# Patient Record
Sex: MALE | Race: WHITE | NOT HISPANIC OR LATINO | Employment: OTHER | URBAN - METROPOLITAN AREA
[De-identification: names, ages, dates, MRNs, and addresses within clinical notes are randomized per-mention and may not be internally consistent; named-entity substitution may affect disease eponyms.]

---

## 2022-07-30 ENCOUNTER — APPOINTMENT (EMERGENCY)
Dept: RADIOLOGY | Facility: HOSPITAL | Age: 62
End: 2022-07-30
Payer: MEDICARE

## 2022-07-30 ENCOUNTER — HOSPITAL ENCOUNTER (EMERGENCY)
Facility: HOSPITAL | Age: 62
Discharge: HOME | End: 2022-07-30
Attending: EMERGENCY MEDICINE | Admitting: EMERGENCY MEDICINE
Payer: MEDICARE

## 2022-07-30 VITALS
OXYGEN SATURATION: 94 % | RESPIRATION RATE: 21 BRPM | HEART RATE: 59 BPM | HEIGHT: 69 IN | TEMPERATURE: 97.8 F | DIASTOLIC BLOOD PRESSURE: 74 MMHG | SYSTOLIC BLOOD PRESSURE: 140 MMHG | BODY MASS INDEX: 29.18 KG/M2 | WEIGHT: 197 LBS

## 2022-07-30 DIAGNOSIS — J44.1 COPD EXACERBATION (HCC): Primary | ICD-10-CM

## 2022-07-30 LAB
2HR DELTA HS TROPONIN: 0 NG/L
ALBUMIN SERPL BCP-MCNC: 3.8 G/DL (ref 3.5–5)
ALP SERPL-CCNC: 86 U/L (ref 46–116)
ALT SERPL W P-5'-P-CCNC: 13 U/L (ref 12–78)
ANION GAP SERPL CALCULATED.3IONS-SCNC: 9 MMOL/L (ref 4–13)
AST SERPL W P-5'-P-CCNC: 17 U/L (ref 5–45)
ATRIAL RATE: 59 BPM
BASOPHILS # BLD AUTO: 0.06 THOUSANDS/ΜL (ref 0–0.1)
BASOPHILS NFR BLD AUTO: 0 % (ref 0–1)
BILIRUB SERPL-MCNC: 0.5 MG/DL (ref 0.2–1)
BUN SERPL-MCNC: 17 MG/DL (ref 5–25)
CALCIUM SERPL-MCNC: 8.8 MG/DL (ref 8.3–10.1)
CARDIAC TROPONIN I PNL SERPL HS: 5 NG/L
CARDIAC TROPONIN I PNL SERPL HS: 5 NG/L
CHLORIDE SERPL-SCNC: 97 MMOL/L (ref 96–108)
CO2 SERPL-SCNC: 28 MMOL/L (ref 21–32)
CREAT SERPL-MCNC: 0.88 MG/DL (ref 0.6–1.3)
D DIMER PPP FEU-MCNC: 0.37 UG/ML FEU
EOSINOPHIL # BLD AUTO: 0.3 THOUSAND/ΜL (ref 0–0.61)
EOSINOPHIL NFR BLD AUTO: 2 % (ref 0–6)
ERYTHROCYTE [DISTWIDTH] IN BLOOD BY AUTOMATED COUNT: 18.2 % (ref 11.6–15.1)
GFR SERPL CREATININE-BSD FRML MDRD: 92 ML/MIN/1.73SQ M
GLUCOSE SERPL-MCNC: 133 MG/DL (ref 65–140)
HCT VFR BLD AUTO: 46.8 % (ref 36.5–49.3)
HGB BLD-MCNC: 14.7 G/DL (ref 12–17)
IMM GRANULOCYTES # BLD AUTO: 0.1 THOUSAND/UL (ref 0–0.2)
IMM GRANULOCYTES NFR BLD AUTO: 1 % (ref 0–2)
LYMPHOCYTES # BLD AUTO: 1.88 THOUSANDS/ΜL (ref 0.6–4.47)
LYMPHOCYTES NFR BLD AUTO: 14 % (ref 14–44)
MCH RBC QN AUTO: 25.4 PG (ref 26.8–34.3)
MCHC RBC AUTO-ENTMCNC: 31.4 G/DL (ref 31.4–37.4)
MCV RBC AUTO: 81 FL (ref 82–98)
MONOCYTES # BLD AUTO: 1.14 THOUSAND/ΜL (ref 0.17–1.22)
MONOCYTES NFR BLD AUTO: 8 % (ref 4–12)
NEUTROPHILS # BLD AUTO: 10.37 THOUSANDS/ΜL (ref 1.85–7.62)
NEUTS SEG NFR BLD AUTO: 75 % (ref 43–75)
NRBC BLD AUTO-RTO: 0 /100 WBCS
P AXIS: 83 DEGREES
PLATELET # BLD AUTO: 349 THOUSANDS/UL (ref 149–390)
PMV BLD AUTO: 9.8 FL (ref 8.9–12.7)
POTASSIUM SERPL-SCNC: 4.4 MMOL/L (ref 3.5–5.3)
PR INTERVAL: 152 MS
PROT SERPL-MCNC: 7.9 G/DL (ref 6.4–8.4)
QRS AXIS: 39 DEGREES
QRSD INTERVAL: 88 MS
QT INTERVAL: 440 MS
QTC INTERVAL: 435 MS
RBC # BLD AUTO: 5.78 MILLION/UL (ref 3.88–5.62)
SARS-COV-2 RNA RESP QL NAA+PROBE: NEGATIVE
SODIUM SERPL-SCNC: 134 MMOL/L (ref 135–147)
T WAVE AXIS: 88 DEGREES
VENTRICULAR RATE: 59 BPM
WBC # BLD AUTO: 13.85 THOUSAND/UL (ref 4.31–10.16)

## 2022-07-30 PROCEDURE — 93010 ELECTROCARDIOGRAM REPORT: CPT | Performed by: INTERNAL MEDICINE

## 2022-07-30 PROCEDURE — 84484 ASSAY OF TROPONIN QUANT: CPT

## 2022-07-30 PROCEDURE — 71045 X-RAY EXAM CHEST 1 VIEW: CPT

## 2022-07-30 PROCEDURE — U0005 INFEC AGEN DETEC AMPLI PROBE: HCPCS

## 2022-07-30 PROCEDURE — 80053 COMPREHEN METABOLIC PANEL: CPT

## 2022-07-30 PROCEDURE — 85025 COMPLETE CBC W/AUTO DIFF WBC: CPT

## 2022-07-30 PROCEDURE — 99285 EMERGENCY DEPT VISIT HI MDM: CPT

## 2022-07-30 PROCEDURE — 85379 FIBRIN DEGRADATION QUANT: CPT

## 2022-07-30 PROCEDURE — 36415 COLL VENOUS BLD VENIPUNCTURE: CPT

## 2022-07-30 PROCEDURE — 93005 ELECTROCARDIOGRAM TRACING: CPT

## 2022-07-30 PROCEDURE — U0003 INFECTIOUS AGENT DETECTION BY NUCLEIC ACID (DNA OR RNA); SEVERE ACUTE RESPIRATORY SYNDROME CORONAVIRUS 2 (SARS-COV-2) (CORONAVIRUS DISEASE [COVID-19]), AMPLIFIED PROBE TECHNIQUE, MAKING USE OF HIGH THROUGHPUT TECHNOLOGIES AS DESCRIBED BY CMS-2020-01-R: HCPCS

## 2022-07-30 RX ORDER — ATENOLOL 50 MG/1
50 TABLET ORAL 2 TIMES DAILY
COMMUNITY

## 2022-07-30 RX ORDER — ROSUVASTATIN CALCIUM 20 MG/1
20 TABLET, COATED ORAL DAILY
COMMUNITY

## 2022-07-30 RX ORDER — DOXYCYCLINE HYCLATE 100 MG/1
100 CAPSULE ORAL 2 TIMES DAILY
Qty: 14 CAPSULE | Refills: 0 | Status: SHIPPED | OUTPATIENT
Start: 2022-07-30 | End: 2022-08-06

## 2022-07-30 RX ORDER — BUPROPION HYDROCHLORIDE 150 MG/1
150 TABLET, EXTENDED RELEASE ORAL 2 TIMES DAILY
COMMUNITY

## 2022-07-30 RX ORDER — ISOSORBIDE MONONITRATE 30 MG/1
30 TABLET, EXTENDED RELEASE ORAL DAILY
COMMUNITY

## 2022-07-30 RX ORDER — DAPAGLIFLOZIN AND METFORMIN HYDROCHLORIDE 10; 1000 MG/1; MG/1
TABLET, FILM COATED, EXTENDED RELEASE ORAL
COMMUNITY

## 2022-07-30 RX ORDER — RANOLAZINE 500 MG/1
500 TABLET, EXTENDED RELEASE ORAL 2 TIMES DAILY
COMMUNITY

## 2022-07-30 NOTE — DISCHARGE INSTRUCTIONS
Take doxycyline 100 mg every 12 hours for the next 7 days     Use your albuterol inhaler every 4-6 hours for SOB symptoms  Make sure you are staying hydrated and resting to allow your body to heal  Schedule an appointment with your PCP for follow-up evaluation regarding ER visit  Return to the ER if you develop chest pain, increasing shortness of breath, fevers, cough up blood, feel dizzy, confused, or like you may pass out

## 2022-07-30 NOTE — ED PROVIDER NOTES
History  Chief Complaint   Patient presents with    Shortness of Breath     Feeling "woozy" for 2 days  Chest heaviness, SOB with exertion, cough     56 y/o male with PMH HTN, CAD, DM2, a  Fib, COPD presents to the ER today for chest heaviness, shortness of breath, cough  Patient says he started not feeling right a week ago  He states yesterday he starting with a productive cough and is coughing up yellow phlegm  States his chest feels heavy, especially when he coughs, and is having difficulty taking deep breaths  SOB worse with ambulation  Those symptoms also started yesterday  Also admits to congestion  Pt denies fever, chills, abdominal pain, nausea, vomiting, changes in urination, changes in BM, dizziness, headache bodyaches  States last week he was in [place] with his friend whose sister has covid  Pt has 2 vaccines but no booster  Had covid a month ago  Tested yesterday and was negative  Allergic to PCN  History provided by:  Patient   used: No    Shortness of Breath  Severity:  Moderate  Onset quality:  Sudden  Duration:  1 day  Timing:  Intermittent  Progression:  Waxing and waning  Chronicity:  Recurrent  Context: activity    Ineffective treatments:  None tried  Associated symptoms: sputum production    Associated symptoms: no abdominal pain, no chest pain, no fever, no headaches, no hemoptysis, no sore throat, no vomiting and no wheezing        Prior to Admission Medications   Prescriptions Last Dose Informant Patient Reported? Taking?    ALBUTEROL IN 7/29/2022 at Unknown time  Yes Yes   Sig: Inhale   Dapagliflozin-metFORMIN HCl ER (Xigduo XR)  MG TB24 7/29/2022 at Unknown time  Yes Yes   Sig: Take by mouth   Fluticasone Furoate-Vilanterol (BREO ELLIPTA IN) 7/29/2022 at Unknown time  Yes Yes   Sig: Inhale   apixaban (Eliquis) 5 mg 7/29/2022 at Unknown time  Yes Yes   Sig: Take 5 mg by mouth 2 (two) times a day   atenolol (TENORMIN) 50 mg tablet 7/29/2022 at Unknown time  Yes Yes   Sig: Take 50 mg by mouth 2 (two) times a day   buPROPion (ZYBAN) 150 MG 12 hr tablet 7/29/2022 at Unknown time  Yes Yes   Sig: Take 150 mg by mouth 2 (two) times a day   isosorbide mononitrate (IMDUR) 30 mg 24 hr tablet 7/29/2022 at Unknown time  Yes Yes   Sig: Take 30 mg by mouth daily   metFORMIN (GLUCOPHAGE) 1000 MG tablet 7/29/2022 at Unknown time  Yes Yes   Sig: Take 1,000 mg by mouth 2 (two) times a day with meals   ranolazine (RANEXA) 500 mg 12 hr tablet 7/29/2022 at Unknown time  Yes Yes   Sig: Take 500 mg by mouth 2 (two) times a day   rosuvastatin (CRESTOR) 20 MG tablet 7/29/2022 at Unknown time  Yes Yes   Sig: Take 20 mg by mouth daily      Facility-Administered Medications: None       Past Medical History:   Diagnosis Date    COPD (chronic obstructive pulmonary disease) ([facility] )     Diabetes mellitus ([facility] )     High cholesterol     Hypertension        Past Surgical History:   Procedure Laterality Date    CHOLECYSTECTOMY      SHOULDER SURGERY         History reviewed  No pertinent family history  I have reviewed and agree with the history as documented  E-Cigarette/Vaping     E-Cigarette/Vaping Substances     Social History     Tobacco Use    Smoking status: Current Every Day Smoker     Packs/day: 0 25    Smokeless tobacco: Never Used   Substance Use Topics    Alcohol use: Never    Drug use: Never       Review of Systems   Constitutional: Negative for chills and fever  HENT: Positive for congestion  Negative for sore throat  Respiratory: Positive for sputum production, chest tightness and shortness of breath  Negative for hemoptysis and wheezing  Cardiovascular: Negative for chest pain  Gastrointestinal: Negative for abdominal pain, nausea and vomiting  Skin: Negative for color change  Neurological: Negative for dizziness, light-headedness and headaches  Psychiatric/Behavioral: Negative for behavioral problems and sleep disturbance     All other systems reviewed and are negative  Physical Exam  Physical Exam  Vitals and nursing note reviewed  Constitutional:       General: He is awake  Appearance: Normal appearance  He is well-developed  Comments: Patient seated comfortably in exam bed   HENT:      Head: Normocephalic and atraumatic  Right Ear: Tympanic membrane, ear canal and external ear normal       Left Ear: Tympanic membrane, ear canal and external ear normal       Nose: Nose normal       Mouth/Throat:      Mouth: Mucous membranes are moist       Pharynx: Oropharynx is clear  Uvula midline  No oropharyngeal exudate or posterior oropharyngeal erythema  Eyes:      General: No scleral icterus  Extraocular Movements: Extraocular movements intact  Pupils: Pupils are equal, round, and reactive to light  Cardiovascular:      Rate and Rhythm: Normal rate and regular rhythm  Heart sounds: Normal heart sounds, S1 normal and S2 normal  No murmur heard  No gallop  Pulmonary:      Effort: Pulmonary effort is normal  Tachypnea present  No respiratory distress  Breath sounds: Normal breath sounds  No decreased breath sounds, wheezing, rhonchi or rales  Comments: Patient not in acute respiratory distress  He is slightly tachypneic at 24 respirations per minute  There is no accessory muscle usage, tripoding, retractions  O2 sats > 94%  Musculoskeletal:         General: Normal range of motion  Cervical back: Normal range of motion and neck supple  Right lower leg: No edema  Left lower leg: No edema  Skin:     General: Skin is warm and dry  Neurological:      General: No focal deficit present  Mental Status: He is alert  Psychiatric:         Attention and Perception: Attention and perception normal          Mood and Affect: Mood normal          Behavior: Behavior normal  Behavior is cooperative           Vital Signs  ED Triage Vitals   Temperature Pulse Respirations Blood Pressure SpO2   07/30/22 0850 07/30/22 0845 07/30/22 0845 07/30/22 0845 07/30/22 0845   97 8 °F (36 6 °C) 58 (!) 24 (!) 179/86 98 %      Temp src Heart Rate Source Patient Position - Orthostatic VS BP Location FiO2 (%)   -- -- -- -- --             Pain Score       --                  Vitals:    07/30/22 0900 07/30/22 1000 07/30/22 1100 07/30/22 1200   BP: 134/72 144/81 138/74 140/74   Pulse: 68 57 64 59         Visual Acuity      ED Medications  Medications - No data to display    Diagnostic Studies  Results Reviewed     Procedure Component Value Units Date/Time    HS Troponin I 2hr [[id number]]  (Normal) Collected: 07/30/22 1117    Lab Status: Final result Specimen: Blood from Arm, Right Updated: 07/30/22 1149     hs TnI 2hr 5 ng/L      Delta 2hr hsTnI 0 ng/L     COVID only [[id number]]  (Normal) Collected: 07/30/22 0927    Lab Status: Final result Specimen: Nares from Nose Updated: 07/30/22 1030     SARS-CoV-2 Negative    Narrative:      FOR PEDIATRIC PATIENTS - copy/paste COVID Guidelines URL to browser: [email or web address]  [employer]    SARS-CoV-2 assay is a Nucleic Acid Amplification assay intended for the  qualitative detection of nucleic acid from SARS-CoV-2 in nasopharyngeal  swabs  Results are for the presumptive identification of SARS-CoV-2 RNA  Positive results are indicative of infection with SARS-CoV-2, the virus  causing COVID-19, but do not rule out bacterial infection or co-infection  with other viruses  Laboratories within the [place] and its  territories are required to report all positive results to the appropriate  public health authorities  Negative results do not preclude SARS-CoV-2  infection and should not be used as the sole basis for treatment or other  patient management decisions  Negative results must be combined with  clinical observations, patient history, and epidemiological information  This test has not been FDA cleared or approved      This test has been authorized by FDA under an Emergency Use Authorization  (EUA)  This test is only authorized for the duration of time the  declaration that circumstances exist justifying the authorization of the  emergency use of an in vitro diagnostic tests for detection of SARS-CoV-2  virus and/or diagnosis of COVID-19 infection under section 564(b)(1) of  the Act, 21 U  S C  474OIJ-4(Q)(0), unless the authorization is terminated  or revoked sooner  The test has been validated but independent review by FDA  and CLIA is pending  Test performed using [employer] Gene[employer]pert: This RT-PCR assay targets N2,  a region unique to SARS-CoV-2  A conserved region in the E-gene was chosen  for pan-Sarbecovirus detection which includes SARS-CoV-2  D-Dimer [[id number]]  (Normal) Collected: 07/30/22 0927    Lab Status: Final result Specimen: Blood from Arm, Right Updated: 07/30/22 1016     D-Dimer, Quant 0 37 ug/ml FEU     Narrative: In the evaluation for possible pulmonary embolism, in the appropriate (Well's Score of 4 or less) patient, the age adjusted d-dimer cutoff for this patient can be calculated as:    Age x 0 01 (in ug/mL) for Age-adjusted D-dimer exclusion threshold for a patient over 50 years      HS Troponin 0hr (reflex protocol) [[id number]]  (Normal) Collected: 07/30/22 0856    Lab Status: Final result Specimen: Blood from Arm, Right Updated: 07/30/22 0930     hs TnI 0hr 5 ng/L     Comprehensive metabolic panel [[id number]]  (Abnormal) Collected: 07/30/22 0856    Lab Status: Final result Specimen: Blood from Arm, Right Updated: 07/30/22 5169     Sodium 134 mmol/L      Potassium 4 4 mmol/L      Chloride 97 mmol/L      CO2 28 mmol/L      ANION GAP 9 mmol/L      BUN 17 mg/dL      Creatinine 0 88 mg/dL      Glucose 133 mg/dL      Calcium 8 8 mg/dL      AST 17 U/L      ALT 13 U/L      Alkaline Phosphatase 86 U/L      Total Protein 7 9 g/dL      Albumin 3 8 g/dL      Total Bilirubin 0 50 mg/dL      eGFR 92 ml/min/1 73sq m     Narrative: National Kidney Disease Foundation guidelines for Chronic Kidney Disease (CKD):     Stage 1 with normal or high GFR (GFR > 90 mL/min/1 73 square meters)    Stage 2 Mild CKD (GFR = 60-89 mL/min/1 73 square meters)    Stage 3A Moderate CKD (GFR = 45-59 mL/min/1 73 square meters)    Stage 3B Moderate CKD (GFR = 30-44 mL/min/1 73 square meters)    Stage 4 Severe CKD (GFR = 15-29 mL/min/1 73 square meters)    Stage 5 End Stage CKD (GFR <15 mL/min/1 73 square meters)  Note: GFR calculation is accurate only with a steady state creatinine    CBC and differential [[id number]]  (Abnormal) Collected: 07/30/22 0856    Lab Status: Final result Specimen: Blood from Arm, Right Updated: 07/30/22 0902     WBC 13 85 Thousand/uL      RBC 5 78 Million/uL      Hemoglobin 14 7 g/dL      Hematocrit 46 8 %      MCV 81 fL      MCH 25 4 pg      MCHC 31 4 g/dL      RDW 18 2 %      MPV 9 8 fL      Platelets 526 Thousands/uL      nRBC 0 /100 WBCs      Neutrophils Relative 75 %      Immat GRANS % 1 %      Lymphocytes Relative 14 %      Monocytes Relative 8 %      Eosinophils Relative 2 %      Basophils Relative 0 %      Neutrophils Absolute 10 37 Thousands/µL      Immature Grans Absolute 0 10 Thousand/uL      Lymphocytes Absolute 1 88 Thousands/µL      Monocytes Absolute 1 14 Thousand/µL      Eosinophils Absolute 0 30 Thousand/µL      Basophils Absolute 0 06 Thousands/µL                  XR chest 1 view portable    (Results Pending)   XR chest 1 view    (Results Pending)              Procedures  ECG 12 Lead Documentation Only    Date/Time: 7/30/2022 9:31 AM  Performed by: Johnny Gates PA-C  Authorized by: Johnny Gates PA-C     Indications / Diagnosis:  Chest heavines, SOB  ECG reviewed by me, the ED Provider: augusto (angie)    Previous ECG:     Previous ECG:  Unavailable  Interpretation:     Interpretation: abnormal    Rate:     ECG rate:  59    ECG rate assessment: bradycardic    Rhythm:     Rhythm: sinus bradycardia Comments:      HR 59 bpm  Sinus bradycardia with PAC, nonspecific ST and T wave abnormality             ED Course             HEART Risk Score    Flowsheet Row Most Recent Value   Heart Score Risk Calculator    History 0 Filed at: 07/30/2022 1611   ECG 1 Filed at: 07/30/2022 1611   Age 1 Filed at: 07/30/2022 1611   Risk Factors 1 Filed at: 07/30/2022 1611   Troponin 0 Filed at: 07/30/2022 1611   HEART Score 3 Filed at: 07/30/2022 1611                PERC Rule for PE    Flowsheet Row Most Recent Value   PERC Rule for PE    Age >=50 1 Filed at: 07/30/2022 0912   HR >=100 0 Filed at: 07/30/2022 0912   O2 Sat on room air < 95% 0 Filed at: 07/30/2022 3302   History of PE or DVT --   Recent trauma or surgery 0 Filed at: 07/30/2022 0912   Hemoptysis 0 Filed at: 07/30/2022 7131   Exogenous estrogen 0 Filed at: 07/30/2022 0912   Unilateral leg swelling 0 Filed at: 07/30/2022 0912   PERC Rule for PE Results 1 Filed at: 07/30/2022 9655                  Wells' Criteria for PE    Flowsheet Row Most Recent Value   Wells' Criteria for PE    Clinical signs and symptoms of DVT 0 Filed at: 07/30/2022 4146   PE is primary diagnosis or equally likely 0 Filed at: 07/30/2022 0913   HR >100 0 Filed at: 07/30/2022 0913   Immobilization at least 3 days or Surgery in the previous 4 weeks 0 Filed at: 07/30/2022 7388   Previous, objectively diagnosed PE or DVT 0 Filed at: 07/30/2022 0913   Hemoptysis 0 Filed at: 07/30/2022 5720   Malignancy with treatment within 6 months or palliative 0 Filed at: 07/30/2022 8595   Wells' Criteria Total 0 Filed at: 07/30/2022 0913                MDM  Number of Diagnoses or Management Options  COPD exacerbation [facility]): new and requires workup  Diagnosis management comments: 56 y/o male  Here for shortness of breath, chest heaviness, productive cough x 2 days  PMH COPD  Pt was in [place] last week with with his friend whose sister just tested positive for covid   Pt tachypneic at 24 upon entering ER but otherwise vitals stable  On exam there is no respiratory distress such as accessory muscle usage, tripoding  PE otherwise benign  Differential pneuomina, COPD exacerbation, bronchitis, ACS, PE  Ordered CBC, CMP, Trop, EKG, D-dimer, CXR, covid  CBC shows elevated WBC at 13 85  First troponin 5  CXR no acute findings noted, chronic scarring on left  Labs otherwise normal  Ambulatory pulse oxygen remained > 92% on room air  Diagnosis of COPD exacerbation due to infectious process  Patient was prescribed doxycycline BID for one week and told to follow up with his PCP regarding his ER visit early next week to ensure his symptoms are getting better  Since no wheezing on exam patient was not prescribed steroids as discussed with attending Dr Edward Solis  He was given care instructions for COPD exacerbation  He was given strict return to ED precautions both verbally and in discharge papers  Patient agreed to this plan  Amount and/or Complexity of Data Reviewed  Clinical lab tests: ordered  Tests in the radiology section of CPT®: ordered and reviewed    Risk of Complications, Morbidity, and/or Mortality  Presenting problems: moderate  Diagnostic procedures: low  Management options: low    Patient Progress  Patient progress: stable      Disposition  Final diagnoses:   COPD exacerbation ([address] )     Time reflects when diagnosis was documented in both MDM as applicable and the Disposition within this note     Time User Action Codes Description Comment    7/30/2022 12:35 PM Jennifer Ordaz Add [J44 1] COPD exacerbation [facility])       ED Disposition     ED Disposition   Discharge    Condition   Stable    Date/Time   Sat Jul 30, 2022 12:35 PM    Comment   Selvin Huerta discharge to home/self care                 Follow-up Information     Follow up With Specialties Details Why Contact Info Additional Information    PCP  Schedule an appointment as soon as possible for a visit  for follow-up evaluation regarding emergency department visit [address] Emergency Department Emergency Medicine Go to  if you develop chest pain, increasing shortness of breath, fevers, cough up blood, feel dizzy, confused, or like you may pass out [address] [facility] Emergency Department, [address], [facility], [address]          Discharge Medication List as of 7/30/2022 12:41 PM      START taking these medications    Details   doxycycline hyclate (VIBRAMYCIN) 100 mg capsule Take 1 capsule (100 mg total) by mouth 2 (two) times a day for 7 days, Starting Sat 7/30/2022, Until Sat 8/6/2022, Normal         CONTINUE these medications which have NOT CHANGED    Details   ALBUTEROL IN Inhale, Historical Med      apixaban (Eliquis) 5 mg Take 5 mg by mouth 2 (two) times a day, Historical Med      atenolol (TENORMIN) 50 mg tablet Take 50 mg by mouth 2 (two) times a day, Historical Med      buPROPion (ZYBAN) 150 MG 12 hr tablet Take 150 mg by mouth 2 (two) times a day, Historical Med      Dapagliflozin-metFORMIN HCl ER (Xigduo XR)  MG TB24 Take by mouth, Historical Med      Fluticasone Furoate-Vilanterol (BREO ELLIPTA IN) Inhale, Historical Med      isosorbide mononitrate (IMDUR) 30 mg 24 hr tablet Take 30 mg by mouth daily, Historical Med      metFORMIN (GLUCOPHAGE) 1000 MG tablet Take 1,000 mg by mouth 2 (two) times a day with meals, Historical Med      ranolazine (RANEXA) 500 mg 12 hr tablet Take 500 mg by mouth 2 (two) times a day, Historical Med      rosuvastatin (CRESTOR) 20 MG tablet Take 20 mg by mouth daily, Historical Med             No discharge procedures on file      PDMP Review     None          ED Provider  Electronically Signed by           Maddy Flores PA-C  07/30/22 2937

## 2023-07-25 ENCOUNTER — APPOINTMENT (EMERGENCY)
Dept: CT IMAGING | Facility: HOSPITAL | Age: 63
End: 2023-07-25
Payer: MEDICARE

## 2023-07-25 ENCOUNTER — APPOINTMENT (EMERGENCY)
Dept: RADIOLOGY | Facility: HOSPITAL | Age: 63
End: 2023-07-25
Payer: MEDICARE

## 2023-07-25 ENCOUNTER — HOSPITAL ENCOUNTER (OUTPATIENT)
Facility: HOSPITAL | Age: 63
Setting detail: OBSERVATION
End: 2023-07-26
Attending: EMERGENCY MEDICINE | Admitting: INTERNAL MEDICINE
Payer: MEDICARE

## 2023-07-25 DIAGNOSIS — J44.1 COPD WITH ACUTE EXACERBATION (HCC): ICD-10-CM

## 2023-07-25 DIAGNOSIS — I21.4 NSTEMI (NON-ST ELEVATED MYOCARDIAL INFARCTION) (HCC): ICD-10-CM

## 2023-07-25 DIAGNOSIS — R07.9 CHEST PAIN, UNSPECIFIED TYPE: ICD-10-CM

## 2023-07-25 DIAGNOSIS — R06.02 SHORTNESS OF BREATH: Primary | ICD-10-CM

## 2023-07-25 DIAGNOSIS — R07.89 OTHER CHEST PAIN: ICD-10-CM

## 2023-07-25 PROBLEM — E11.9 TYPE 2 DIABETES MELLITUS (HCC): Status: ACTIVE | Noted: 2023-07-25

## 2023-07-25 PROBLEM — J96.01 ACUTE RESPIRATORY FAILURE WITH HYPOXIA (HCC): Status: ACTIVE | Noted: 2023-07-25

## 2023-07-25 PROBLEM — R65.10 SIRS (SYSTEMIC INFLAMMATORY RESPONSE SYNDROME) (HCC): Status: ACTIVE | Noted: 2023-07-25

## 2023-07-25 PROBLEM — I48.91 ATRIAL FIBRILLATION (HCC): Status: ACTIVE | Noted: 2023-07-25

## 2023-07-25 PROBLEM — J44.9 COPD (CHRONIC OBSTRUCTIVE PULMONARY DISEASE) (HCC): Status: ACTIVE | Noted: 2023-07-25

## 2023-07-25 LAB
2HR DELTA HS TROPONIN: -1 NG/L
2HR DELTA HS TROPONIN: 447 NG/L
4HR DELTA HS TROPONIN: 6 NG/L
4HR DELTA HS TROPONIN: 837 NG/L
ALBUMIN SERPL BCP-MCNC: 3.9 G/DL (ref 3.5–5)
ALP SERPL-CCNC: 75 U/L (ref 34–104)
ALT SERPL W P-5'-P-CCNC: 9 U/L (ref 7–52)
AMORPH URATE CRY URNS QL MICRO: ABNORMAL
ANION GAP SERPL CALCULATED.3IONS-SCNC: 10 MMOL/L
APTT PPP: 29 SECONDS (ref 23–37)
AST SERPL W P-5'-P-CCNC: 9 U/L (ref 13–39)
BACTERIA UR QL AUTO: ABNORMAL /HPF
BASOPHILS # BLD AUTO: 0.09 THOUSANDS/ÂΜL (ref 0–0.1)
BASOPHILS NFR BLD AUTO: 1 % (ref 0–1)
BILIRUB SERPL-MCNC: 0.63 MG/DL (ref 0.2–1)
BILIRUB UR QL STRIP: NEGATIVE
BNP SERPL-MCNC: 239 PG/ML (ref 0–100)
BUN SERPL-MCNC: 16 MG/DL (ref 5–25)
CALCIUM SERPL-MCNC: 8.9 MG/DL (ref 8.4–10.2)
CARDIAC TROPONIN I PNL SERPL HS: 12 NG/L
CARDIAC TROPONIN I PNL SERPL HS: 5 NG/L
CARDIAC TROPONIN I PNL SERPL HS: 512 NG/L
CARDIAC TROPONIN I PNL SERPL HS: 6 NG/L
CARDIAC TROPONIN I PNL SERPL HS: 65 NG/L
CARDIAC TROPONIN I PNL SERPL HS: 902 NG/L
CHLORIDE SERPL-SCNC: 97 MMOL/L (ref 96–108)
CLARITY UR: CLEAR
CO2 SERPL-SCNC: 26 MMOL/L (ref 21–32)
COLOR UR: YELLOW
CREAT SERPL-MCNC: 0.71 MG/DL (ref 0.6–1.3)
D DIMER PPP FEU-MCNC: 0.63 UG/ML FEU
EOSINOPHIL # BLD AUTO: 0.07 THOUSAND/ÂΜL (ref 0–0.61)
EOSINOPHIL NFR BLD AUTO: 0 % (ref 0–6)
ERYTHROCYTE [DISTWIDTH] IN BLOOD BY AUTOMATED COUNT: 17.7 % (ref 11.6–15.1)
EST. AVERAGE GLUCOSE BLD GHB EST-MCNC: 194 MG/DL
FLUAV RNA RESP QL NAA+PROBE: NEGATIVE
FLUBV RNA RESP QL NAA+PROBE: NEGATIVE
GFR SERPL CREATININE-BSD FRML MDRD: 99 ML/MIN/1.73SQ M
GLUCOSE SERPL-MCNC: 128 MG/DL (ref 65–140)
GLUCOSE SERPL-MCNC: 246 MG/DL (ref 65–140)
GLUCOSE UR STRIP-MCNC: ABNORMAL MG/DL
HBA1C MFR BLD: 8.4 %
HCT VFR BLD AUTO: 47.8 % (ref 36.5–49.3)
HGB BLD-MCNC: 14.7 G/DL (ref 12–17)
HGB UR QL STRIP.AUTO: NEGATIVE
IMM GRANULOCYTES # BLD AUTO: 0.09 THOUSAND/UL (ref 0–0.2)
IMM GRANULOCYTES NFR BLD AUTO: 1 % (ref 0–2)
INR PPP: 1.07 (ref 0.84–1.19)
KETONES UR STRIP-MCNC: ABNORMAL MG/DL
L PNEUMO1 AG UR QL IA.RAPID: NEGATIVE
LACTATE SERPL-SCNC: 0.8 MMOL/L (ref 0.5–2)
LEUKOCYTE ESTERASE UR QL STRIP: NEGATIVE
LYMPHOCYTES # BLD AUTO: 0.99 THOUSANDS/ÂΜL (ref 0.6–4.47)
LYMPHOCYTES NFR BLD AUTO: 6 % (ref 14–44)
MCH RBC QN AUTO: 24.5 PG (ref 26.8–34.3)
MCHC RBC AUTO-ENTMCNC: 30.8 G/DL (ref 31.4–37.4)
MCV RBC AUTO: 80 FL (ref 82–98)
MONOCYTES # BLD AUTO: 1.57 THOUSAND/ÂΜL (ref 0.17–1.22)
MONOCYTES NFR BLD AUTO: 10 % (ref 4–12)
NEUTROPHILS # BLD AUTO: 13.34 THOUSANDS/ÂΜL (ref 1.85–7.62)
NEUTS SEG NFR BLD AUTO: 82 % (ref 43–75)
NITRITE UR QL STRIP: NEGATIVE
NON-SQ EPI CELLS URNS QL MICRO: ABNORMAL /HPF
NRBC BLD AUTO-RTO: 0 /100 WBCS
PH UR STRIP.AUTO: 5.5 [PH]
PLATELET # BLD AUTO: 293 THOUSANDS/UL (ref 149–390)
PMV BLD AUTO: 9.4 FL (ref 8.9–12.7)
POTASSIUM SERPL-SCNC: 4.3 MMOL/L (ref 3.5–5.3)
PROCALCITONIN SERPL-MCNC: 0.07 NG/ML
PROT SERPL-MCNC: 7.4 G/DL (ref 6.4–8.4)
PROT UR STRIP-MCNC: ABNORMAL MG/DL
PROTHROMBIN TIME: 14.7 SECONDS (ref 11.6–14.5)
RBC # BLD AUTO: 6 MILLION/UL (ref 3.88–5.62)
RBC #/AREA URNS AUTO: ABNORMAL /HPF
RSV RNA RESP QL NAA+PROBE: NEGATIVE
S PNEUM AG UR QL: NEGATIVE
SARS-COV-2 RNA RESP QL NAA+PROBE: NEGATIVE
SODIUM SERPL-SCNC: 133 MMOL/L (ref 135–147)
SP GR UR STRIP.AUTO: 1.02 (ref 1–1.03)
UROBILINOGEN UR STRIP-ACNC: <2 MG/DL
WBC # BLD AUTO: 16.15 THOUSAND/UL (ref 4.31–10.16)
WBC #/AREA URNS AUTO: ABNORMAL /HPF

## 2023-07-25 PROCEDURE — 36415 COLL VENOUS BLD VENIPUNCTURE: CPT | Performed by: PHYSICIAN ASSISTANT

## 2023-07-25 PROCEDURE — 85610 PROTHROMBIN TIME: CPT | Performed by: PHYSICIAN ASSISTANT

## 2023-07-25 PROCEDURE — 87154 CUL TYP ID BLD PTHGN 6+ TRGT: CPT | Performed by: PHYSICIAN ASSISTANT

## 2023-07-25 PROCEDURE — 82948 REAGENT STRIP/BLOOD GLUCOSE: CPT

## 2023-07-25 PROCEDURE — 99223 1ST HOSP IP/OBS HIGH 75: CPT

## 2023-07-25 PROCEDURE — 81001 URINALYSIS AUTO W/SCOPE: CPT

## 2023-07-25 PROCEDURE — 84484 ASSAY OF TROPONIN QUANT: CPT | Performed by: PHYSICIAN ASSISTANT

## 2023-07-25 PROCEDURE — 99285 EMERGENCY DEPT VISIT HI MDM: CPT

## 2023-07-25 PROCEDURE — 87077 CULTURE AEROBIC IDENTIFY: CPT | Performed by: PHYSICIAN ASSISTANT

## 2023-07-25 PROCEDURE — 96374 THER/PROPH/DIAG INJ IV PUSH: CPT

## 2023-07-25 PROCEDURE — 0241U HB NFCT DS VIR RESP RNA 4 TRGT: CPT

## 2023-07-25 PROCEDURE — 94760 N-INVAS EAR/PLS OXIMETRY 1: CPT

## 2023-07-25 PROCEDURE — 84145 PROCALCITONIN (PCT): CPT | Performed by: PHYSICIAN ASSISTANT

## 2023-07-25 PROCEDURE — 94640 AIRWAY INHALATION TREATMENT: CPT

## 2023-07-25 PROCEDURE — 87449 NOS EACH ORGANISM AG IA: CPT

## 2023-07-25 PROCEDURE — 83880 ASSAY OF NATRIURETIC PEPTIDE: CPT | Performed by: PHYSICIAN ASSISTANT

## 2023-07-25 PROCEDURE — 85025 COMPLETE CBC W/AUTO DIFF WBC: CPT | Performed by: PHYSICIAN ASSISTANT

## 2023-07-25 PROCEDURE — 71275 CT ANGIOGRAPHY CHEST: CPT

## 2023-07-25 PROCEDURE — 83605 ASSAY OF LACTIC ACID: CPT | Performed by: PHYSICIAN ASSISTANT

## 2023-07-25 PROCEDURE — 94664 DEMO&/EVAL PT USE INHALER: CPT

## 2023-07-25 PROCEDURE — 99285 EMERGENCY DEPT VISIT HI MDM: CPT | Performed by: PHYSICIAN ASSISTANT

## 2023-07-25 PROCEDURE — 94644 CONT INHLJ TX 1ST HOUR: CPT

## 2023-07-25 PROCEDURE — 71045 X-RAY EXAM CHEST 1 VIEW: CPT

## 2023-07-25 PROCEDURE — 83036 HEMOGLOBIN GLYCOSYLATED A1C: CPT

## 2023-07-25 PROCEDURE — 93005 ELECTROCARDIOGRAM TRACING: CPT

## 2023-07-25 PROCEDURE — 87040 BLOOD CULTURE FOR BACTERIA: CPT | Performed by: PHYSICIAN ASSISTANT

## 2023-07-25 PROCEDURE — 85730 THROMBOPLASTIN TIME PARTIAL: CPT | Performed by: PHYSICIAN ASSISTANT

## 2023-07-25 PROCEDURE — G1004 CDSM NDSC: HCPCS

## 2023-07-25 PROCEDURE — 84484 ASSAY OF TROPONIN QUANT: CPT

## 2023-07-25 PROCEDURE — 87081 CULTURE SCREEN ONLY: CPT

## 2023-07-25 PROCEDURE — 85379 FIBRIN DEGRADATION QUANT: CPT | Performed by: PHYSICIAN ASSISTANT

## 2023-07-25 PROCEDURE — 80053 COMPREHEN METABOLIC PANEL: CPT | Performed by: PHYSICIAN ASSISTANT

## 2023-07-25 RX ORDER — GUAIFENESIN 600 MG/1
600 TABLET, EXTENDED RELEASE ORAL EVERY 12 HOURS SCHEDULED
Status: DISCONTINUED | OUTPATIENT
Start: 2023-07-25 | End: 2023-07-26 | Stop reason: HOSPADM

## 2023-07-25 RX ORDER — IPRATROPIUM BROMIDE AND ALBUTEROL SULFATE 2.5; .5 MG/3ML; MG/3ML
3 SOLUTION RESPIRATORY (INHALATION) ONCE
Status: COMPLETED | OUTPATIENT
Start: 2023-07-25 | End: 2023-07-25

## 2023-07-25 RX ORDER — HEPARIN SODIUM 10000 [USP'U]/100ML
3-20 INJECTION, SOLUTION INTRAVENOUS
Status: DISCONTINUED | OUTPATIENT
Start: 2023-07-25 | End: 2023-07-26 | Stop reason: HOSPADM

## 2023-07-25 RX ORDER — INSULIN LISPRO 100 [IU]/ML
1-6 INJECTION, SOLUTION INTRAVENOUS; SUBCUTANEOUS
Status: DISCONTINUED | OUTPATIENT
Start: 2023-07-25 | End: 2023-07-26 | Stop reason: HOSPADM

## 2023-07-25 RX ORDER — ATORVASTATIN CALCIUM 40 MG/1
40 TABLET, FILM COATED ORAL
Status: DISCONTINUED | OUTPATIENT
Start: 2023-07-25 | End: 2023-07-26 | Stop reason: HOSPADM

## 2023-07-25 RX ORDER — NITROGLYCERIN 0.4 MG/1
0.4 TABLET SUBLINGUAL
Status: DISCONTINUED | OUTPATIENT
Start: 2023-07-25 | End: 2023-07-26 | Stop reason: HOSPADM

## 2023-07-25 RX ORDER — ATENOLOL 50 MG/1
50 TABLET ORAL DAILY
Status: DISCONTINUED | OUTPATIENT
Start: 2023-07-25 | End: 2023-07-26 | Stop reason: HOSPADM

## 2023-07-25 RX ORDER — ISOSORBIDE MONONITRATE 30 MG/1
30 TABLET, EXTENDED RELEASE ORAL DAILY
Status: DISCONTINUED | OUTPATIENT
Start: 2023-07-25 | End: 2023-07-26

## 2023-07-25 RX ORDER — METHYLPREDNISOLONE SODIUM SUCCINATE 40 MG/ML
40 INJECTION, POWDER, LYOPHILIZED, FOR SOLUTION INTRAMUSCULAR; INTRAVENOUS EVERY 8 HOURS SCHEDULED
Status: DISCONTINUED | OUTPATIENT
Start: 2023-07-26 | End: 2023-07-26 | Stop reason: HOSPADM

## 2023-07-25 RX ORDER — BUDESONIDE 0.5 MG/2ML
0.5 INHALANT ORAL
Status: DISCONTINUED | OUTPATIENT
Start: 2023-07-25 | End: 2023-07-26 | Stop reason: HOSPADM

## 2023-07-25 RX ORDER — LEVALBUTEROL INHALATION SOLUTION 0.63 MG/3ML
0.63 SOLUTION RESPIRATORY (INHALATION)
Status: DISCONTINUED | OUTPATIENT
Start: 2023-07-25 | End: 2023-07-26 | Stop reason: HOSPADM

## 2023-07-25 RX ORDER — RANOLAZINE 500 MG/1
500 TABLET, EXTENDED RELEASE ORAL 2 TIMES DAILY
Status: DISCONTINUED | OUTPATIENT
Start: 2023-07-25 | End: 2023-07-26 | Stop reason: HOSPADM

## 2023-07-25 RX ORDER — ACETAMINOPHEN 325 MG/1
650 TABLET ORAL EVERY 6 HOURS PRN
Status: DISCONTINUED | OUTPATIENT
Start: 2023-07-25 | End: 2023-07-26 | Stop reason: HOSPADM

## 2023-07-25 RX ORDER — ASPIRIN 81 MG/1
324 TABLET, CHEWABLE ORAL ONCE
Status: COMPLETED | OUTPATIENT
Start: 2023-07-25 | End: 2023-07-25

## 2023-07-25 RX ORDER — METHYLPREDNISOLONE SODIUM SUCCINATE 125 MG/2ML
125 INJECTION, POWDER, LYOPHILIZED, FOR SOLUTION INTRAMUSCULAR; INTRAVENOUS ONCE
Status: COMPLETED | OUTPATIENT
Start: 2023-07-25 | End: 2023-07-25

## 2023-07-25 RX ORDER — FORMOTEROL FUMARATE 20 UG/2ML
20 SOLUTION RESPIRATORY (INHALATION)
Status: DISCONTINUED | OUTPATIENT
Start: 2023-07-25 | End: 2023-07-26 | Stop reason: HOSPADM

## 2023-07-25 RX ORDER — HEPARIN SODIUM 1000 [USP'U]/ML
2000 INJECTION, SOLUTION INTRAVENOUS; SUBCUTANEOUS EVERY 6 HOURS PRN
Status: DISCONTINUED | OUTPATIENT
Start: 2023-07-25 | End: 2023-07-26 | Stop reason: HOSPADM

## 2023-07-25 RX ORDER — BUPROPION HYDROCHLORIDE 150 MG/1
150 TABLET ORAL DAILY
Status: DISCONTINUED | OUTPATIENT
Start: 2023-07-25 | End: 2023-07-26 | Stop reason: HOSPADM

## 2023-07-25 RX ORDER — DIPHENHYDRAMINE HYDROCHLORIDE 50 MG/ML
25 INJECTION INTRAMUSCULAR; INTRAVENOUS EVERY 6 HOURS PRN
Status: DISCONTINUED | OUTPATIENT
Start: 2023-07-25 | End: 2023-07-26 | Stop reason: HOSPADM

## 2023-07-25 RX ORDER — CEFTRIAXONE 1 G/50ML
1000 INJECTION, SOLUTION INTRAVENOUS EVERY 24 HOURS
Status: DISCONTINUED | OUTPATIENT
Start: 2023-07-25 | End: 2023-07-26 | Stop reason: HOSPADM

## 2023-07-25 RX ORDER — SODIUM CHLORIDE FOR INHALATION 0.9 %
3 VIAL, NEBULIZER (ML) INHALATION ONCE
Status: COMPLETED | OUTPATIENT
Start: 2023-07-25 | End: 2023-07-25

## 2023-07-25 RX ORDER — HEPARIN SODIUM 1000 [USP'U]/ML
4000 INJECTION, SOLUTION INTRAVENOUS; SUBCUTANEOUS EVERY 6 HOURS PRN
Status: DISCONTINUED | OUTPATIENT
Start: 2023-07-25 | End: 2023-07-26 | Stop reason: HOSPADM

## 2023-07-25 RX ORDER — INSULIN LISPRO 100 [IU]/ML
1-5 INJECTION, SOLUTION INTRAVENOUS; SUBCUTANEOUS
Status: DISCONTINUED | OUTPATIENT
Start: 2023-07-25 | End: 2023-07-26 | Stop reason: HOSPADM

## 2023-07-25 RX ORDER — MAGNESIUM SULFATE HEPTAHYDRATE 40 MG/ML
2 INJECTION, SOLUTION INTRAVENOUS ONCE
Status: COMPLETED | OUTPATIENT
Start: 2023-07-25 | End: 2023-07-25

## 2023-07-25 RX ADMIN — FORMOTEROL FUMARATE DIHYDRATE 20 MCG: 20 SOLUTION RESPIRATORY (INHALATION) at 22:01

## 2023-07-25 RX ADMIN — INSULIN LISPRO 2 UNITS: 100 INJECTION, SOLUTION INTRAVENOUS; SUBCUTANEOUS at 21:33

## 2023-07-25 RX ADMIN — CEFTRIAXONE 1000 MG: 1 INJECTION, SOLUTION INTRAVENOUS at 16:36

## 2023-07-25 RX ADMIN — IPRATROPIUM BROMIDE 0.5 MG: 0.5 SOLUTION RESPIRATORY (INHALATION) at 22:01

## 2023-07-25 RX ADMIN — ATORVASTATIN CALCIUM 40 MG: 40 TABLET, FILM COATED ORAL at 16:36

## 2023-07-25 RX ADMIN — IPRATROPIUM BROMIDE 1 MG: 0.5 SOLUTION RESPIRATORY (INHALATION) at 11:25

## 2023-07-25 RX ADMIN — ATENOLOL 50 MG: 50 TABLET ORAL at 16:42

## 2023-07-25 RX ADMIN — IPRATROPIUM BROMIDE AND ALBUTEROL SULFATE 3 ML: 2.5; .5 SOLUTION RESPIRATORY (INHALATION) at 10:10

## 2023-07-25 RX ADMIN — BUPROPION HYDROCHLORIDE 150 MG: 150 TABLET, EXTENDED RELEASE ORAL at 16:42

## 2023-07-25 RX ADMIN — APIXABAN 5 MG: 5 TABLET, FILM COATED ORAL at 17:22

## 2023-07-25 RX ADMIN — ASPIRIN 81 MG CHEWABLE TABLET 324 MG: 81 TABLET CHEWABLE at 13:07

## 2023-07-25 RX ADMIN — GUAIFENESIN 600 MG: 600 TABLET ORAL at 20:57

## 2023-07-25 RX ADMIN — ALBUTEROL SULFATE 10 MG: 2.5 SOLUTION RESPIRATORY (INHALATION) at 11:25

## 2023-07-25 RX ADMIN — Medication 3 ML: at 11:25

## 2023-07-25 RX ADMIN — IPRATROPIUM BROMIDE AND ALBUTEROL SULFATE 3 ML: 2.5; .5 SOLUTION RESPIRATORY (INHALATION) at 14:36

## 2023-07-25 RX ADMIN — AZITHROMYCIN MONOHYDRATE 500 MG: 500 INJECTION, POWDER, LYOPHILIZED, FOR SOLUTION INTRAVENOUS at 17:22

## 2023-07-25 RX ADMIN — MAGNESIUM SULFATE HEPTAHYDRATE 2 G: 2 INJECTION, SOLUTION INTRAVENOUS at 14:36

## 2023-07-25 RX ADMIN — HEPARIN SODIUM 11.8 UNITS/KG/HR: 10000 INJECTION, SOLUTION INTRAVENOUS at 22:19

## 2023-07-25 RX ADMIN — ISOSORBIDE MONONITRATE 30 MG: 30 TABLET, EXTENDED RELEASE ORAL at 16:42

## 2023-07-25 RX ADMIN — METHYLPREDNISOLONE SODIUM SUCCINATE 125 MG: 125 INJECTION, POWDER, FOR SOLUTION INTRAMUSCULAR; INTRAVENOUS at 11:19

## 2023-07-25 RX ADMIN — BUDESONIDE 0.5 MG: 0.5 INHALANT ORAL at 22:01

## 2023-07-25 RX ADMIN — RANOLAZINE 500 MG: 500 TABLET, EXTENDED RELEASE ORAL at 17:22

## 2023-07-25 RX ADMIN — LEVALBUTEROL HYDROCHLORIDE 0.63 MG: 0.63 SOLUTION RESPIRATORY (INHALATION) at 22:01

## 2023-07-25 RX ADMIN — IOHEXOL 100 ML: 350 INJECTION, SOLUTION INTRAVENOUS at 13:36

## 2023-07-25 NOTE — ASSESSMENT & PLAN NOTE
· SIRS: WBC 16, tachypnea   · Lactic and procal WNL   · No source identified currently, possible viral vs acute COPD exacerbation vs PNA  · CXR: Small lingular opacity may represent pneumonia. · CTA chest: Pulmonary scarring noted left upper lobe especially within the lingular segment.  Mild dependent changes noted at the lung bases  · UA pending   · BC x 2 pending   · Will treat empirically for possible PNA given leukocytosis and subjective fevers/chills at home however if procal neg x 2 consider discontinuing abx
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
## 2023-07-25 NOTE — ASSESSMENT & PLAN NOTE
· Update A1C  · Hold home oral medications  · ISS + accucheks  · Carb controlled diet   · Monitor for steroid induced hyperglycemia

## 2023-07-25 NOTE — ASSESSMENT & PLAN NOTE
Hx of CAD s/p CABG & 3 stents. Last seen by OP Cardiology March 2023 at which time patient had reported ongoing intermittent exertional midsternal CP.    · Patient experienced brief CP in ED at time of second trop draw described as "chest tightness" which resolved with PO aspirin and duoneb  · Trops: 6 - 5 - 12  · Delta 2 hr (-1) - 4 hr (6)  · EKG with ST depressions in inferior leads/V5/V4 --> appear slightly more pronounced than prior 2022 however noted with nonspecific ST and T wave changes in recent outpatient note as well   · Continue to monitor sx and trend trops/serial EKGs -->if no improvement, consult to cardiology  · Tele   · Continue home ranexa + imdur + atenolol  · Chest pain currently resolved

## 2023-07-25 NOTE — ASSESSMENT & PLAN NOTE
· Presented for SOB x 3 days with associated headache, chills, night sweats, and chest pain. · No O2 at baseline, admitted on 4L NC  · CXR: Small lingular opacity may represent pneumonia. · CTA Chest without evidence of PE or other acute pulmonary findings --> "Pulmonary scarring noted left upper lobe especially within the lingular segment.  Mild dependent changes noted at the lung bases."  ·   · COVID/FLU/RSV negative   · Suspected acute COPD exacerbation, see below  · Wean O2 as able

## 2023-07-25 NOTE — ED PROVIDER NOTES
History  Chief Complaint   Patient presents with   • Shortness of Breath     Patient reports to ED due to headache and inability to catch his breath. Patient reports chest pain when laying down. Patient has hx COPD and MI. Accessory muscle use noted in triage. Patient does not wear oxygen. 60year old male with a PMH of CAD x3 stents, Afib, COPD presenting with a 2 day history of shortness of breath, headache, chest pain, and night sweats. Patient reports that he lives in [place] and travelled here about 2 weeks ago, and since then it has been progressively getting worse. He denies associated dizziness, radiation of chest pain into his shoulder or neck, abdominal pain, nausea, vomiting, palpitations. He smokes one cigarette every morning. He denies leg pain and swelling. History provided by:  Patient  Shortness of Breath  Severity:  Moderate  Onset quality:  Gradual  Duration:  2 days  Timing:  Constant  Progression:  Worsening  Chronicity:  New  Relieved by:  Nothing  Worsened by:  Exertion  Ineffective treatments:  Inhaler  Associated symptoms: cough, diaphoresis, fever and headaches    Associated symptoms: no abdominal pain, no chest pain, no rash, no sore throat, no sputum production and no vomiting    Risk factors: tobacco use    Risk factors: no hx of cancer and no prolonged immobilization        Prior to Admission Medications   Prescriptions Last Dose Informant Patient Reported? Taking?    ALBUTEROL IN   Yes No   Sig: Inhale   Dapagliflozin-metFORMIN HCl ER (Xigduo XR)  MG TB24   Yes No   Sig: Take by mouth   Fluticasone Furoate-Vilanterol (BREO ELLIPTA IN)   Yes No   Sig: Inhale   apixaban (Eliquis) 5 mg   Yes No   Sig: Take 5 mg by mouth 2 (two) times a day   atenolol (TENORMIN) 50 mg tablet   Yes No   Sig: Take 50 mg by mouth 2 (two) times a day   buPROPion (ZYBAN) 150 MG 12 hr tablet   Yes No   Sig: Take 150 mg by mouth 2 (two) times a day   isosorbide mononitrate (IMDUR) 30 mg 24 hr tablet   Yes No   Sig: Take 30 mg by mouth daily   ranolazine (RANEXA) 500 mg 12 hr tablet   Yes No   Sig: Take 500 mg by mouth 2 (two) times a day   rosuvastatin (CRESTOR) 20 MG tablet   Yes No   Sig: Take 20 mg by mouth daily      Facility-Administered Medications: None       Past Medical History:   Diagnosis Date   • COPD (chronic obstructive pulmonary disease) ([address])    • Diabetes mellitus ([address])    • High cholesterol    • Hypertension        Past Surgical History:   Procedure Laterality Date   • CHOLECYSTECTOMY     • SHOULDER SURGERY         History reviewed. No pertinent family history. I have reviewed and agree with the history as documented. E-Cigarette/Vaping   • E-Cigarette Use Never User      E-Cigarette/Vaping Substances   • Nicotine No    • THC No    • CBD No    • Flavoring No    • Other No    • Unknown No      Social History     Tobacco Use   • Smoking status: Every Day     Packs/day: 0.25     Types: Cigarettes   • Smokeless tobacco: Never   Vaping Use   • Vaping Use: Never used   Substance Use Topics   • Alcohol use: Never   • Drug use: Never       Review of Systems   Constitutional: Positive for diaphoresis, fatigue and fever. Negative for chills. HENT: Negative for congestion and sore throat. Eyes: Negative for visual disturbance. Respiratory: Positive for cough and shortness of breath. Negative for sputum production. Cardiovascular: Negative for chest pain, palpitations and leg swelling. Gastrointestinal: Negative for abdominal pain, nausea and vomiting. Genitourinary: Negative for dysuria. Musculoskeletal: Negative for back pain. Skin: Negative for color change and rash. Neurological: Positive for weakness and headaches. Negative for dizziness, facial asymmetry, speech difficulty, light-headedness and numbness. Psychiatric/Behavioral: Negative for confusion. All other systems reviewed and are negative.       Physical Exam  Physical Exam  Vitals and nursing note reviewed. Constitutional:       General: He is in acute distress. Appearance: Normal appearance. He is well-developed and normal weight. He is not ill-appearing or diaphoretic. HENT:      Head: Normocephalic and atraumatic. Right Ear: Hearing normal.      Left Ear: Hearing normal.      Nose: Nose normal.      Mouth/Throat:      Mouth: Mucous membranes are moist.      Pharynx: Oropharynx is clear. Eyes:      Conjunctiva/sclera: Conjunctivae normal.   Cardiovascular:      Rate and Rhythm: Normal rate and regular rhythm. Pulses: Normal pulses. No decreased pulses. Heart sounds: Normal heart sounds, S1 normal and S2 normal. No murmur heard. No friction rub. No gallop. Pulmonary:      Effort: Tachypnea, accessory muscle usage and respiratory distress present. Breath sounds: Examination of the right-upper field reveals wheezing. Examination of the left-upper field reveals wheezing. Examination of the right-middle field reveals wheezing. Examination of the left-middle field reveals wheezing. Examination of the right-lower field reveals wheezing. Examination of the left-lower field reveals wheezing. Wheezing present. No rhonchi or rales. Abdominal:      General: Abdomen is flat. Bowel sounds are normal.      Palpations: Abdomen is soft. Tenderness: There is no abdominal tenderness. Musculoskeletal:      Cervical back: Normal range of motion. Right lower leg: No edema. Left lower leg: No edema. Skin:     General: Skin is warm and dry. Coloration: Skin is not jaundiced or pale. Findings: No rash. Neurological:      General: No focal deficit present. Mental Status: He is alert and oriented to person, place, and time. Cranial Nerves: No cranial nerve deficit. Motor: No weakness. Psychiatric:         Attention and Perception: Attention and perception normal.         Mood and Affect: Mood normal.         Behavior: Behavior is cooperative. Vital Signs  ED Triage Vitals [07/25/23 0941]   Temperature Pulse Respirations Blood Pressure SpO2   98.2 °F (36.8 °C) 78 (!) 28 135/77 91 %      Temp Source Heart Rate Source Patient Position - Orthostatic VS BP Location FiO2 (%)   Oral Monitor Sitting Left arm --      Pain Score       No Pain           Vitals:    07/25/23 1230 07/25/23 1337 07/25/23 1438 07/25/23 1445   BP: 132/78 135/71  146/76   Pulse: 77 87 75 74   Patient Position - Orthostatic VS:  Sitting Sitting Sitting         Visual Acuity      ED Medications  Medications   magnesium sulfate 2 g/50 mL IVPB (premix) 2 g (2 g Intravenous New Bag 7/25/23 1436)   insulin lispro (HumaLOG) 100 units/mL subcutaneous injection 1-6 Units (has no administration in time range)   insulin lispro (HumaLOG) 100 units/mL subcutaneous injection 1-5 Units (has no administration in time range)   azithromycin (ZITHROMAX) 500 mg in sodium chloride 0.9% 250mL IVPB 500 mg (has no administration in time range)   guaiFENesin (MUCINEX) 12 hr tablet 600 mg (has no administration in time range)   acetaminophen (TYLENOL) tablet 650 mg (has no administration in time range)   levalbuterol (XOPENEX) inhalation solution 0.63 mg (has no administration in time range)   ipratropium (ATROVENT) 0.02 % inhalation solution 0.5 mg (has no administration in time range)   formoterol (PERFOROMIST) nebulizer solution 20 mcg (has no administration in time range)   budesonide (PULMICORT) inhalation solution 0.5 mg (has no administration in time range)   atorvastatin (LIPITOR) tablet 40 mg (has no administration in time range)   ranolazine (RANEXA) 12 hr tablet 500 mg (has no administration in time range)   isosorbide mononitrate (IMDUR) 24 hr tablet 30 mg (has no administration in time range)   atenolol (TENORMIN) tablet 50 mg (has no administration in time range)   apixaban (ELIQUIS) tablet 5 mg (has no administration in time range)   buPROPion (WELLBUTRIN XL) 24 hr tablet 150 mg (has no administration in time range)   ipratropium-albuterol (DUO-NEB) 0.5-2.5 mg/3 mL inhalation solution 3 mL (3 mL Nebulization Given 7/25/23 1010)   albuterol inhalation solution 10 mg (10 mg Nebulization Given 7/25/23 1125)     And   ipratropium (ATROVENT) 0.02 % inhalation solution 1 mg (1 mg Nebulization Given 7/25/23 1125)     And   sodium chloride 0.9 % inhalation solution 3 mL (3 mL Nebulization Given 7/25/23 1125)   methylPREDNISolone sodium succinate (Solu-MEDROL) injection 125 mg (125 mg Intravenous Given 7/25/23 1119)   aspirin chewable tablet 324 mg (324 mg Oral Given 7/25/23 1307)   iohexol (OMNIPAQUE) 350 MG/ML injection (SINGLE-DOSE) 100 mL (100 mL Intravenous Given 7/25/23 1336)   ipratropium-albuterol (DUO-NEB) 0.5-2.5 mg/3 mL inhalation solution 3 mL (3 mL Nebulization Given 7/25/23 1436)       Diagnostic Studies  Results Reviewed     Procedure Component Value Units Date/Time    Sputum culture and Gram stain [[id number]]     Lab Status: No result Specimen: Expectorated Sputum     COVID/FLU/RSV [[id number]]  (Normal) Collected: 07/25/23 1435    Lab Status: Final result Specimen: Nares from Nose Updated: 07/25/23 1525     SARS-CoV-2 Negative     INFLUENZA A PCR Negative     INFLUENZA B PCR Negative     RSV PCR Negative    Narrative:      FOR PEDIATRIC PATIENTS - copy/paste COVID Guidelines URL to browser: [email or web address]    SARS-CoV-2 assay is a Nucleic Acid Amplification assay intended for the  qualitative detection of nucleic acid from SARS-CoV-2 in nasopharyngeal  swabs. Results are for the presumptive identification of SARS-CoV-2 RNA. Positive results are indicative of infection with SARS-CoV-2, the virus  causing COVID-19, but do not rule out bacterial infection or co-infection  with other viruses.  Laboratories within the [facility] and its  territories are required to report all positive results to the appropriate  public health authorities. Negative results do not preclude SARS-CoV-2  infection and should not be used as the sole basis for treatment or other  patient management decisions. Negative results must be combined with  clinical observations, patient history, and epidemiological information. This test has not been FDA cleared or approved. This test has been authorized by FDA under an Emergency Use Authorization  (EUA). This test is only authorized for the duration of time the  declaration that circumstances exist justifying the authorization of the  emergency use of an in vitro diagnostic tests for detection of SARS-CoV-2  virus and/or diagnosis of COVID-19 infection under section 564(b)(1) of  the Act, 21 U. S.C. 843FKQ-5(R)(4), unless the authorization is terminated  or revoked sooner. The test has been validated but independent review by FDA  and CLIA is pending. Test performed using [employer]: This RT-PCR assay targets N2,  a region unique to SARS-CoV-2. A conserved region in the E-gene was chosen  for pan-Sarbecovirus detection which includes SARS-CoV-2. According to CMS-2020-01-R, this platform meets the definition of high-throughput technology.     UA w Reflex to Microscopic w Reflex to Culture [[id number]]     Lab Status: No result Specimen: Urine     Hemoglobin A1c w/EAG Estimation (Orders if not completed within the last 90 days) [[id number]]     Lab Status: No result Specimen: Blood     HS Troponin I 4hr [[id number]]  (Normal) Collected: 07/25/23 1435    Lab Status: Final result Specimen: Blood from Arm, Right Updated: 07/25/23 1509     hs TnI 4hr 12 ng/L      Delta 4hr hsTnI 6 ng/L     HS Troponin I 2hr [[id number]]  (Normal) Collected: 07/25/23 1248    Lab Status: Final result Specimen: Blood from Arm, Right Updated: 07/25/23 1318     hs TnI 2hr 5 ng/L      Delta 2hr hsTnI -1 ng/L     D-Dimer [[id number]]  (Abnormal) Collected: 07/25/23 0956    Lab Status: Final result Specimen: Blood from Arm, Right Updated: 07/25/23 1206     D-Dimer, Quant 0.63 ug/ml FEU     Narrative: In the evaluation for possible pulmonary embolism, in the appropriate (Well's Score of 4 or less) patient, the age adjusted d-dimer cutoff for this patient can be calculated as:    Age x 0.01 (in ug/mL) for Age-adjusted D-dimer exclusion threshold for a patient over 50 years. Procalcitonin [[id number]]  (Normal) Collected: 07/25/23 1036    Lab Status: Final result Specimen: Blood from Arm, Right Updated: 07/25/23 1117     Procalcitonin 0.07 ng/ml     Lactic acid, plasma (w/reflex if result > 2.0) [[id number]]  (Normal) Collected: 07/25/23 1036    Lab Status: Final result Specimen: Blood from Arm, Right Updated: 07/25/23 1101     LACTIC ACID 0.8 mmol/L     Narrative:      Result may be elevated if tourniquet was used during collection. Blood culture #2 [[id number]] Collected: 07/25/23 1036    Lab Status: In process Specimen: Blood from Arm, Right Updated: 07/25/23 1042    Blood culture #1 [[id number]] Collected: 07/25/23 1036    Lab Status:  In process Specimen: Blood from Arm, Left Updated: 07/25/23 1042    HS Troponin 0hr (reflex protocol) [[id number]]  (Normal) Collected: 07/25/23 0956    Lab Status: Final result Specimen: Blood from Arm, Right Updated: 07/25/23 1032     hs TnI 0hr 6 ng/L     B-Type Natriuretic Peptide(BNP) [[id number]]  (Abnormal) Collected: 07/25/23 0956    Lab Status: Final result Specimen: Blood from Arm, Right Updated: 07/25/23 1030      pg/mL     Comprehensive metabolic panel [[id number]]  (Abnormal) Collected: 07/25/23 0956    Lab Status: Final result Specimen: Blood from Arm, Right Updated: 07/25/23 1025     Sodium 133 mmol/L      Potassium 4.3 mmol/L      Chloride 97 mmol/L      CO2 26 mmol/L      ANION GAP 10 mmol/L      BUN 16 mg/dL      Creatinine 0.71 mg/dL      Glucose 128 mg/dL      Calcium 8.9 mg/dL      AST 9 U/L      ALT 9 U/L      Alkaline Phosphatase 75 U/L      Total Protein 7.4 g/dL      Albumin 3.9 g/dL Total Bilirubin 0.63 mg/dL      eGFR 99 ml/min/1.73sq m     Narrative:      National Kidney Disease Foundation guidelines for Chronic Kidney Disease (CKD):   •  Stage 1 with normal or high GFR (GFR > 90 mL/min/1.73 square meters)  •  Stage 2 Mild CKD (GFR = 60-89 mL/min/1.73 square meters)  •  Stage 3A Moderate CKD (GFR = 45-59 mL/min/1.73 square meters)  •  Stage 3B Moderate CKD (GFR = 30-44 mL/min/1.73 square meters)  •  Stage 4 Severe CKD (GFR = 15-29 mL/min/1.73 square meters)  •  Stage 5 End Stage CKD (GFR <15 mL/min/1.73 square meters)  Note: GFR calculation is accurate only with a steady state creatinine    Protime-INR [[id number]]  (Abnormal) Collected: 07/25/23 0956    Lab Status: Final result Specimen: Blood from Arm, Right Updated: 07/25/23 1025     Protime 14.7 seconds      INR 1.07    APTT [[id number]]  (Normal) Collected: 07/25/23 0956    Lab Status: Final result Specimen: Blood from Arm, Right Updated: 07/25/23 1025     PTT 29 seconds     CBC and differential [[id number]]  (Abnormal) Collected: 07/25/23 0956    Lab Status: Final result Specimen: Blood from Arm, Right Updated: 07/25/23 1007     WBC 16.15 Thousand/uL      RBC 6.00 Million/uL      Hemoglobin 14.7 g/dL      Hematocrit 47.8 %      MCV 80 fL      MCH 24.5 pg      MCHC 30.8 g/dL      RDW 17.7 %      MPV 9.4 fL      Platelets 063 Thousands/uL      nRBC 0 /100 WBCs      Neutrophils Relative 82 %      Immat GRANS % 1 %      Lymphocytes Relative 6 %      Monocytes Relative 10 %      Eosinophils Relative 0 %      Basophils Relative 1 %      Neutrophils Absolute 13.34 Thousands/µL      Immature Grans Absolute 0.09 Thousand/uL      Lymphocytes Absolute 0.99 Thousands/µL      Monocytes Absolute 1.57 Thousand/µL      Eosinophils Absolute 0.07 Thousand/µL      Basophils Absolute 0.09 Thousands/µL                  CTA ED chest PE study   Final Result by Real Santos MD (07/25 1342)   No PE. No acute pulmonary findings.                      Workstation performed: [id number]         XR chest 1 view portable   ED Interpretation by Gera Patel PA-C (07/25 1032)   No acute abnormalities. Final Result by Alana Burkitt, MD (07/25 1532)      Small lingular opacity may represent pneumonia. The study was marked in [facility] for immediate notification. Workstation performed: [id number]                    Procedures  ECG 12 Lead Documentation Only    Date/Time: 7/25/2023 10:05 AM    Performed by: Gera Patel PA-C  Authorized by: Gera Patel PA-C    Indications / Diagnosis:  SOB  ECG reviewed by me, the ED Provider: yes    Patient location:  ED  Previous ECG:     Previous ECG:  Compared to current    Comparison ECG info:  PVCs now present    Similarity:  Changes noted  Rate:     ECG rate:  70  Rhythm:     Rhythm: sinus rhythm    Ectopy:     Ectopy: PVCs      PVCs:  Frequent  ST segments:     ST segments:  Normal  T waves:     T waves: non-specific    ECG 12 Lead Documentation Only    Date/Time: 7/25/2023 1:07 PM    Performed by: Gera Patel PA-C  Authorized by: Gera Patel PA-C    Indications / Diagnosis:  Chest pain  ECG reviewed by me, the ED Provider: yes    Patient location:  ED  Previous ECG:     Previous ECG:  Compared to current    Comparison ECG info:  ST depression now present in II, III, avf, v4, v5. Similarity:  Changes noted  Rate:     ECG rate:  77  Rhythm:     Rhythm: sinus rhythm    ST segments:     ST segments:  Abnormal    Depression:  II, III, aVF, V4 and V5  T waves:     T waves: non-specific               ED Course  ED Course as of 07/25/23 1225 [facility] Jul 25, 2023   1000 Patient currently on 4L NC, sats at 95%. 1025 Patient states he is feeling a lot better, now able to talk full sentences without difficulty. No longer in distress. 1104 Wheezing improved after  nebulizer, still with decreased air movement, will order steroids and hanna tx. Pulse ox 94% on 4L. 1304 Patient states he developed right sided chest pain 5 minutes ago, pulse ox 88%, he did not have his oxygen on. Will order ekg and repeat trop in process. 1313 Patient states he is feeling better, pain improved. Pulse ox 90% on 4L oxygen. Patient instructed to keep his oxygen on at all times. He now agrees to go to [facility] for admission. SBIRT 20yo+    Flowsheet Row Most Recent Value   Initial Alcohol Screen: US AUDIT-C     1. How often do you have a drink containing alcohol? 0 Filed at: 07/25/2023 0944   2. How many drinks containing alcohol do you have on a typical day you are drinking? 0 Filed at: 07/25/2023 0944   3a. Male UNDER 65: How often do you have five or more drinks on one occasion? 0 Filed at: 07/25/2023 0944   3b. FEMALE Any Age, or MALE 65+: How often do you have 4 or more drinks on one occassion? 0 Filed at: 07/25/2023 0944   Audit-C Score 0 Filed at: 07/25/2023 6062   YEN: How many times in the past year have you. .. Used an illegal drug or used a prescription medication for non-medical reasons? Never Filed at: 07/25/2023 5039          Wells' Criteria for PE    Flowsheet Row Most Recent Value   Wells' Criteria for PE    Clinical signs and symptoms of DVT 0 Filed at: 07/25/2023 1221   PE is primary diagnosis or equally likely 0 Filed at: 07/25/2023 1221   HR >100 0 Filed at: 07/25/2023 1221   Immobilization at least 3 days or Surgery in the previous 4 weeks 0 Filed at: 07/25/2023 1221   Previous, objectively diagnosed PE or DVT 0 Filed at: 07/25/2023 1221   Hemoptysis 0 Filed at: 07/25/2023 1221   Malignancy with treatment within 6 months or palliative 0 Filed at: 07/25/2023 1221   Wells' Criteria Total 0 Filed at: 07/25/2023 1221                Medical Decision Making  Patient with SOB for a couple days. Will order labs, CXR to r/o pneumonia, CHF, COPD exacerbation, cardiac disease.   Patient with elevated ddimer, will order CT to r/o PE.  No acute abnormalities on CT scan. Patient improved with nebulizer. He did develop chest pain while in the ER, but improved with aspirin and placing oxygen back on patient. Patient remains hypoxic on RA, will admit for COPD exacerbation. COPD with acute exacerbation ([address]): acute illness or injury  Shortness of breath: acute illness or injury  Amount and/or Complexity of Data Reviewed  External Data Reviewed: ECG. Labs: ordered. Radiology: ordered and independent interpretation performed. ECG/medicine tests: ordered and independent interpretation performed. Risk  OTC drugs. Prescription drug management. Decision regarding hospitalization. Disposition  Final diagnoses:   Shortness of breath   COPD with acute exacerbation ([address])     Time reflects when diagnosis was documented in both MDM as applicable and the Disposition within this note     Time User Action Codes Description Comment    7/25/2023 10:53 AM Johnathan Lovelace [R06.02] Shortness of breath     7/25/2023  2:07 PM Johnathan Lovelace [J44.1] COPD with acute exacerbation [facility])       ED Disposition     ED Disposition   Admit    Condition   Stable    Date/Time   Tue Jul 25, 2023  2:07 PM    Comment   Case was discussed with Dr. Caryl Garcia and the patient's admission status was agreed to be Admission Status: observation status to the service of Dr. Caryl Garcia . Follow-up Information    None         Patient's Medications   Discharge Prescriptions    No medications on file       No discharge procedures on file.     PDMP Review     None          ED Provider  Electronically Signed by           Massiel Diehl PA-C  07/25/23 9669
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
## 2023-07-26 ENCOUNTER — APPOINTMENT (OUTPATIENT)
Dept: NON INVASIVE DIAGNOSTICS | Facility: HOSPITAL | Age: 63
End: 2023-07-26
Payer: MEDICARE

## 2023-07-26 ENCOUNTER — HOSPITAL ENCOUNTER (INPATIENT)
Facility: HOSPITAL | Age: 63
LOS: 5 days | Discharge: HOME | DRG: 280 | End: 2023-07-31
Attending: STUDENT IN AN ORGANIZED HEALTH CARE EDUCATION/TRAINING PROGRAM | Admitting: STUDENT IN AN ORGANIZED HEALTH CARE EDUCATION/TRAINING PROGRAM
Payer: MEDICARE

## 2023-07-26 VITALS
HEIGHT: 69 IN | RESPIRATION RATE: 34 BRPM | SYSTOLIC BLOOD PRESSURE: 143 MMHG | TEMPERATURE: 97.4 F | WEIGHT: 190.92 LBS | BODY MASS INDEX: 28.28 KG/M2 | HEART RATE: 72 BPM | OXYGEN SATURATION: 89 % | DIASTOLIC BLOOD PRESSURE: 64 MMHG

## 2023-07-26 DIAGNOSIS — I21.4 NSTEMI (NON-ST ELEVATED MYOCARDIAL INFARCTION) (HCC): Primary | ICD-10-CM

## 2023-07-26 DIAGNOSIS — J44.9 COPD (CHRONIC OBSTRUCTIVE PULMONARY DISEASE) (HCC): ICD-10-CM

## 2023-07-26 DIAGNOSIS — R09.02 HYPOXEMIA: ICD-10-CM

## 2023-07-26 PROBLEM — E78.5 HYPERLIPIDEMIA: Status: ACTIVE | Noted: 2023-07-26

## 2023-07-26 LAB
2HR DELTA HS TROPONIN: -134 NG/L
4HR DELTA HS TROPONIN: -57 NG/L
ALBUMIN SERPL BCP-MCNC: 3.5 G/DL (ref 3.5–5)
ALP SERPL-CCNC: 64 U/L (ref 34–104)
ALT SERPL W P-5'-P-CCNC: 10 U/L (ref 7–52)
ANION GAP SERPL CALCULATED.3IONS-SCNC: 5 MMOL/L
AORTIC ROOT: 2.9 CM
AORTIC VALVE MEAN VELOCITY: 15.3 M/S
APICAL FOUR CHAMBER EJECTION FRACTION: 64 %
APTT PPP: 34 SECONDS (ref 23–37)
APTT PPP: 36 SECONDS (ref 23–37)
APTT PPP: 39 SECONDS (ref 23–37)
APTT PPP: 45 SECONDS (ref 23–37)
ASCENDING AORTA: 3.3 CM
AST SERPL W P-5'-P-CCNC: 12 U/L (ref 13–39)
ATRIAL RATE: 58 BPM
ATRIAL RATE: 59 BPM
ATRIAL RATE: 63 BPM
ATRIAL RATE: 70 BPM
ATRIAL RATE: 76 BPM
ATRIAL RATE: 77 BPM
ATRIAL RATE: 78 BPM
AV AREA BY CONTINUOUS VTI: 1.7 CM2
AV AREA PEAK VELOCITY: 1.5 CM2
AV LVOT MEAN GRADIENT: 1 MMHG
AV LVOT PEAK GRADIENT: 3 MMHG
AV MEAN GRADIENT: 11 MMHG
AV PEAK GRADIENT: 22 MMHG
AV VALVE AREA: 1.66 CM2
AV VELOCITY RATIO: 0.35
AVA (PLAN): 1.4 CM2
BASOPHILS # BLD AUTO: 0.06 THOUSANDS/ÂΜL (ref 0–0.1)
BASOPHILS NFR BLD AUTO: 0 % (ref 0–1)
BILIRUB SERPL-MCNC: 0.29 MG/DL (ref 0.2–1)
BUN SERPL-MCNC: 23 MG/DL (ref 5–25)
CALCIUM SERPL-MCNC: 8.6 MG/DL (ref 8.4–10.2)
CARDIAC TROPONIN I PNL SERPL HS: 686 NG/L (ref 8–18)
CARDIAC TROPONIN I PNL SERPL HS: 755 NG/L
CARDIAC TROPONIN I PNL SERPL HS: 832 NG/L
CARDIAC TROPONIN I PNL SERPL HS: 889 NG/L
CHLORIDE SERPL-SCNC: 100 MMOL/L (ref 96–108)
CHOLEST SERPL-MCNC: 91 MG/DL
CO2 SERPL-SCNC: 29 MMOL/L (ref 21–32)
CREAT SERPL-MCNC: 0.77 MG/DL (ref 0.6–1.3)
DOP CALC AO PEAK VEL: 2.32 M/S
DOP CALC AO VTI: 54.74 CM
DOP CALC LVOT AREA: 4.15 CM2
DOP CALC LVOT CARDIAC INDEX: 5.61 L/MIN/M2
DOP CALC LVOT CARDIAC OUTPUT: 11.39 L/MIN
DOP CALC LVOT DIAMETER: 2.3 CM
DOP CALC LVOT PEAK VEL VTI: 21.93 CM
DOP CALC LVOT PEAK VEL: 0.81 M/S
DOP CALC LVOT STROKE INDEX: 44.3 ML/M2
DOP CALC LVOT STROKE VOLUME: 91.07 CM3
E WAVE DECELERATION TIME: 319 MS
EOSINOPHIL # BLD AUTO: 0 THOUSAND/ÂΜL (ref 0–0.61)
EOSINOPHIL NFR BLD AUTO: 0 % (ref 0–6)
ERYTHROCYTE [DISTWIDTH] IN BLOOD BY AUTOMATED COUNT: 17.2 % (ref 11.6–15.1)
FRACTIONAL SHORTENING: 32 % (ref 28–44)
GFR SERPL CREATININE-BSD FRML MDRD: 96 ML/MIN/1.73SQ M
GLUCOSE SERPL-MCNC: 136 MG/DL (ref 65–140)
GLUCOSE SERPL-MCNC: 191 MG/DL (ref 65–140)
GLUCOSE SERPL-MCNC: 194 MG/DL (ref 65–140)
GLUCOSE SERPL-MCNC: 240 MG/DL (ref 65–140)
GLUCOSE SERPL-MCNC: 288 MG/DL (ref 65–140)
GLUCOSE SERPL-MCNC: 326 MG/DL (ref 65–140)
HCT VFR BLD AUTO: 46 % (ref 36.5–49.3)
HDLC SERPL-MCNC: 19 MG/DL
HGB BLD-MCNC: 13.9 G/DL (ref 12–17)
IMM GRANULOCYTES # BLD AUTO: 0.13 THOUSAND/UL (ref 0–0.2)
IMM GRANULOCYTES NFR BLD AUTO: 1 % (ref 0–2)
INTERVENTRICULAR SEPTUM IN DIASTOLE (PARASTERNAL SHORT AXIS VIEW): 1.2 CM
INTERVENTRICULAR SEPTUM: 1.2 CM (ref 0.6–1.1)
LAAS-AP2: 15.5 CM2
LAAS-AP4: 20.8 CM2
LDLC SERPL CALC-MCNC: 49 MG/DL (ref 0–100)
LEFT ATRIUM SIZE: 3.6 CM
LEFT ATRIUM VOLUME (MOD BIPLANE): 53 ML
LEFT INTERNAL DIMENSION IN SYSTOLE: 3.6 CM (ref 2.1–4)
LEFT VENTRICLE DIASTOLIC VOLUME (MOD BIPLANE): 207 ML
LEFT VENTRICLE SYSTOLIC VOLUME (MOD BIPLANE): 81 ML
LEFT VENTRICULAR INTERNAL DIMENSION IN DIASTOLE: 5.3 CM (ref 3.5–6)
LEFT VENTRICULAR POSTERIOR WALL IN END DIASTOLE: 1.2 CM
LEFT VENTRICULAR STROKE VOLUME: 80 ML
LV EF: 61 %
LVSV (TEICH): 80 ML
LYMPHOCYTES # BLD AUTO: 0.87 THOUSANDS/ÂΜL (ref 0.6–4.47)
LYMPHOCYTES NFR BLD AUTO: 6 % (ref 14–44)
MAGNESIUM SERPL-MCNC: 2.1 MG/DL (ref 1.9–2.7)
MAGNESIUM SERPL-MCNC: 2.4 MG/DL (ref 1.9–2.7)
MCH RBC QN AUTO: 24.1 PG (ref 26.8–34.3)
MCHC RBC AUTO-ENTMCNC: 30.2 G/DL (ref 31.4–37.4)
MCV RBC AUTO: 80 FL (ref 82–98)
MONOCYTES # BLD AUTO: 0.91 THOUSAND/ÂΜL (ref 0.17–1.22)
MONOCYTES NFR BLD AUTO: 6 % (ref 4–12)
MV E'TISSUE VEL-SEP: 7 CM/S
MV PEAK A VEL: 0.6 M/S
MV PEAK E VEL: 102 CM/S
MV STENOSIS PRESSURE HALF TIME: 92 MS
MV VALVE AREA P 1/2 METHOD: 2.39 CM2
NEUTROPHILS # BLD AUTO: 13.04 THOUSANDS/ÂΜL (ref 1.85–7.62)
NEUTS SEG NFR BLD AUTO: 87 % (ref 43–75)
NRBC BLD AUTO-RTO: 0 /100 WBCS
P AXIS: 61 DEGREES
P AXIS: 73 DEGREES
P AXIS: 73 DEGREES
P AXIS: 75 DEGREES
P AXIS: 76 DEGREES
P AXIS: 77 DEGREES
P AXIS: 80 DEGREES
PLATELET # BLD AUTO: 294 THOUSANDS/UL (ref 149–390)
PMV BLD AUTO: 9.1 FL (ref 8.9–12.7)
POTASSIUM SERPL-SCNC: 4.9 MMOL/L (ref 3.5–5.3)
PR INTERVAL: 150 MS
PR INTERVAL: 150 MS
PR INTERVAL: 156 MS
PR INTERVAL: 156 MS
PR INTERVAL: 158 MS
PR INTERVAL: 164 MS
PR INTERVAL: 164 MS
PROCALCITONIN SERPL-MCNC: 0.06 NG/ML
PROT SERPL-MCNC: 6.6 G/DL (ref 6.4–8.4)
QRS AXIS: 42 DEGREES
QRS AXIS: 45 DEGREES
QRS AXIS: 46 DEGREES
QRS AXIS: 59 DEGREES
QRS AXIS: 67 DEGREES
QRS AXIS: 68 DEGREES
QRS AXIS: 71 DEGREES
QRSD INTERVAL: 100 MS
QRSD INTERVAL: 100 MS
QRSD INTERVAL: 102 MS
QRSD INTERVAL: 106 MS
QRSD INTERVAL: 94 MS
QRSD INTERVAL: 96 MS
QRSD INTERVAL: 98 MS
QT INTERVAL: 416 MS
QT INTERVAL: 420 MS
QT INTERVAL: 434 MS
QT INTERVAL: 436 MS
QT INTERVAL: 464 MS
QT INTERVAL: 484 MS
QT INTERVAL: 494 MS
QTC INTERVAL: 470 MS
QTC INTERVAL: 474 MS
QTC INTERVAL: 474 MS
QTC INTERVAL: 475 MS
QTC INTERVAL: 475 MS
QTC INTERVAL: 488 MS
QTC INTERVAL: 489 MS
RA PRESSURE ESTIMATED: 3 MMHG
RBC # BLD AUTO: 5.76 MILLION/UL (ref 3.88–5.62)
RIGHT ATRIUM AREA SYSTOLE A4C: 18.1 CM2
RIGHT VENTRICLE ID DIMENSION: 4.3 CM
RV PSP: 39 MMHG
SL CV LEFT ATRIUM LENGTH A2C: 4.8 CM
SL CV LV EF: 65
SL CV PED ECHO LEFT VENTRICLE DIASTOLIC VOLUME (MOD BIPLANE) 2D: 135 ML
SL CV PED ECHO LEFT VENTRICLE SYSTOLIC VOLUME (MOD BIPLANE) 2D: 56 ML
SODIUM SERPL-SCNC: 134 MMOL/L (ref 135–147)
T WAVE AXIS: 85 DEGREES
T WAVE AXIS: 87 DEGREES
T WAVE AXIS: 87 DEGREES
T WAVE AXIS: 90 DEGREES
T WAVE AXIS: 90 DEGREES
T WAVE AXIS: 91 DEGREES
T WAVE AXIS: 94 DEGREES
TR MAX PG: 36 MMHG
TR PEAK VELOCITY: 3 M/S
TRICUSPID ANNULAR PLANE SYSTOLIC EXCURSION: 2.3 CM
TRICUSPID VALVE PEAK REGURGITATION VELOCITY: 3 M/S
TRIGL SERPL-MCNC: 117 MG/DL
TSH SERPL DL<=0.05 MIU/L-ACNC: 0.47 UIU/ML (ref 0.45–4.5)
VENTRICULAR RATE: 58 BPM
VENTRICULAR RATE: 59 BPM
VENTRICULAR RATE: 63 BPM
VENTRICULAR RATE: 70 BPM
VENTRICULAR RATE: 76 BPM
VENTRICULAR RATE: 77 BPM
VENTRICULAR RATE: 78 BPM
WBC # BLD AUTO: 15.01 THOUSAND/UL (ref 4.31–10.16)

## 2023-07-26 PROCEDURE — 93005 ELECTROCARDIOGRAM TRACING: CPT

## 2023-07-26 PROCEDURE — 93306 TTE W/DOPPLER COMPLETE: CPT

## 2023-07-26 PROCEDURE — 82948 REAGENT STRIP/BLOOD GLUCOSE: CPT

## 2023-07-26 PROCEDURE — 94760 N-INVAS EAR/PLS OXIMETRY 1: CPT

## 2023-07-26 PROCEDURE — 99204 OFFICE O/P NEW MOD 45 MIN: CPT | Performed by: INTERNAL MEDICINE

## 2023-07-26 PROCEDURE — 80053 COMPREHEN METABOLIC PANEL: CPT

## 2023-07-26 PROCEDURE — 87040 BLOOD CULTURE FOR BACTERIA: CPT | Performed by: PHYSICIAN ASSISTANT

## 2023-07-26 PROCEDURE — 84484 ASSAY OF TROPONIN QUANT: CPT | Performed by: PHYSICIAN ASSISTANT

## 2023-07-26 PROCEDURE — 85730 THROMBOPLASTIN TIME PARTIAL: CPT | Performed by: FAMILY MEDICINE

## 2023-07-26 PROCEDURE — 99239 HOSP IP/OBS DSCHRG MGMT >30: CPT | Performed by: PHYSICIAN ASSISTANT

## 2023-07-26 PROCEDURE — 94640 AIRWAY INHALATION TREATMENT: CPT

## 2023-07-26 PROCEDURE — 84484 ASSAY OF TROPONIN QUANT: CPT | Performed by: INTERNAL MEDICINE

## 2023-07-26 PROCEDURE — 84145 PROCALCITONIN (PCT): CPT

## 2023-07-26 PROCEDURE — 85025 COMPLETE CBC W/AUTO DIFF WBC: CPT

## 2023-07-26 PROCEDURE — 83735 ASSAY OF MAGNESIUM: CPT

## 2023-07-26 PROCEDURE — 99223 1ST HOSP IP/OBS HIGH 75: CPT | Performed by: STUDENT IN AN ORGANIZED HEALTH CARE EDUCATION/TRAINING PROGRAM

## 2023-07-26 PROCEDURE — 93306 TTE W/DOPPLER COMPLETE: CPT | Performed by: INTERNAL MEDICINE

## 2023-07-26 PROCEDURE — 85730 THROMBOPLASTIN TIME PARTIAL: CPT | Performed by: PHYSICIAN ASSISTANT

## 2023-07-26 PROCEDURE — 80061 LIPID PANEL: CPT | Performed by: PHYSICIAN ASSISTANT

## 2023-07-26 PROCEDURE — 84443 ASSAY THYROID STIM HORMONE: CPT | Performed by: PHYSICIAN ASSISTANT

## 2023-07-26 PROCEDURE — 83735 ASSAY OF MAGNESIUM: CPT | Performed by: PHYSICIAN ASSISTANT

## 2023-07-26 PROCEDURE — 99205 OFFICE O/P NEW HI 60 MIN: CPT | Performed by: INTERNAL MEDICINE

## 2023-07-26 PROCEDURE — 94762 N-INVAS EAR/PLS OXIMTRY CONT: CPT

## 2023-07-26 PROCEDURE — 93010 ELECTROCARDIOGRAM REPORT: CPT | Performed by: INTERNAL MEDICINE

## 2023-07-26 RX ORDER — ACETAMINOPHEN 325 MG/1
650 TABLET ORAL EVERY 6 HOURS PRN
Status: CANCELLED | OUTPATIENT
Start: 2023-07-26

## 2023-07-26 RX ORDER — ACETAMINOPHEN 325 MG/1
650 TABLET ORAL EVERY 6 HOURS PRN
Status: DISCONTINUED | OUTPATIENT
Start: 2023-07-26 | End: 2023-07-31 | Stop reason: HOSPADM

## 2023-07-26 RX ORDER — HEPARIN SODIUM 1000 [USP'U]/ML
2000 INJECTION, SOLUTION INTRAVENOUS; SUBCUTANEOUS EVERY 6 HOURS PRN
Status: CANCELLED | OUTPATIENT
Start: 2023-07-26

## 2023-07-26 RX ORDER — ISOSORBIDE MONONITRATE 30 MG/1
30 TABLET, EXTENDED RELEASE ORAL DAILY
Status: DISCONTINUED | OUTPATIENT
Start: 2023-07-27 | End: 2023-07-26 | Stop reason: HOSPADM

## 2023-07-26 RX ORDER — NITROGLYCERIN 20 MG/100ML
5-200 INJECTION INTRAVENOUS
Status: DISCONTINUED | OUTPATIENT
Start: 2023-07-26 | End: 2023-07-26 | Stop reason: HOSPADM

## 2023-07-26 RX ORDER — RANOLAZINE 500 MG/1
500 TABLET, EXTENDED RELEASE ORAL 2 TIMES DAILY
Status: CANCELLED | OUTPATIENT
Start: 2023-07-26

## 2023-07-26 RX ORDER — NITROGLYCERIN 0.4 MG/1
0.4 TABLET SUBLINGUAL
Status: CANCELLED | OUTPATIENT
Start: 2023-07-26

## 2023-07-26 RX ORDER — BUDESONIDE 0.5 MG/2ML
0.5 INHALANT ORAL
Status: DISCONTINUED | OUTPATIENT
Start: 2023-07-27 | End: 2023-07-31 | Stop reason: HOSPADM

## 2023-07-26 RX ORDER — ATORVASTATIN CALCIUM 40 MG/1
40 TABLET, FILM COATED ORAL
Status: DISCONTINUED | OUTPATIENT
Start: 2023-07-27 | End: 2023-07-31 | Stop reason: HOSPADM

## 2023-07-26 RX ORDER — GUAIFENESIN 600 MG/1
600 TABLET, EXTENDED RELEASE ORAL EVERY 12 HOURS SCHEDULED
Status: CANCELLED | OUTPATIENT
Start: 2023-07-26

## 2023-07-26 RX ORDER — CLOPIDOGREL BISULFATE 75 MG/1
75 TABLET ORAL DAILY
Status: CANCELLED | OUTPATIENT
Start: 2023-07-27

## 2023-07-26 RX ORDER — DIPHENHYDRAMINE HYDROCHLORIDE 50 MG/ML
25 INJECTION INTRAMUSCULAR; INTRAVENOUS EVERY 6 HOURS PRN
Status: DISCONTINUED | OUTPATIENT
Start: 2023-07-26 | End: 2023-07-31 | Stop reason: HOSPADM

## 2023-07-26 RX ORDER — CHLORHEXIDINE GLUCONATE 0.12 MG/ML
15 RINSE ORAL EVERY 12 HOURS SCHEDULED
Status: DISCONTINUED | OUTPATIENT
Start: 2023-07-26 | End: 2023-07-26 | Stop reason: HOSPADM

## 2023-07-26 RX ORDER — HEPARIN SODIUM 1000 [USP'U]/ML
4000 INJECTION, SOLUTION INTRAVENOUS; SUBCUTANEOUS EVERY 6 HOURS PRN
Status: DISCONTINUED | OUTPATIENT
Start: 2023-07-26 | End: 2023-07-27

## 2023-07-26 RX ORDER — ISOSORBIDE MONONITRATE 30 MG/1
30 TABLET, EXTENDED RELEASE ORAL DAILY
Status: CANCELLED | OUTPATIENT
Start: 2023-07-27

## 2023-07-26 RX ORDER — METHYLPREDNISOLONE SODIUM SUCCINATE 40 MG/ML
40 INJECTION, POWDER, LYOPHILIZED, FOR SOLUTION INTRAMUSCULAR; INTRAVENOUS EVERY 8 HOURS SCHEDULED
Status: DISCONTINUED | OUTPATIENT
Start: 2023-07-26 | End: 2023-07-27

## 2023-07-26 RX ORDER — ATENOLOL 50 MG/1
50 TABLET ORAL DAILY
Status: CANCELLED | OUTPATIENT
Start: 2023-07-27

## 2023-07-26 RX ORDER — ASPIRIN 81 MG/1
81 TABLET, CHEWABLE ORAL DAILY
Status: CANCELLED | OUTPATIENT
Start: 2023-07-27

## 2023-07-26 RX ORDER — ATORVASTATIN CALCIUM 40 MG/1
40 TABLET, FILM COATED ORAL
Status: CANCELLED | OUTPATIENT
Start: 2023-07-26

## 2023-07-26 RX ORDER — INSULIN LISPRO 100 [IU]/ML
1-5 INJECTION, SOLUTION INTRAVENOUS; SUBCUTANEOUS
Status: CANCELLED | OUTPATIENT
Start: 2023-07-26

## 2023-07-26 RX ORDER — ASPIRIN 81 MG/1
81 TABLET, CHEWABLE ORAL DAILY
Status: DISCONTINUED | OUTPATIENT
Start: 2023-07-26 | End: 2023-07-26 | Stop reason: HOSPADM

## 2023-07-26 RX ORDER — INSULIN LISPRO 100 [IU]/ML
1-6 INJECTION, SOLUTION INTRAVENOUS; SUBCUTANEOUS
Status: CANCELLED | OUTPATIENT
Start: 2023-07-26

## 2023-07-26 RX ORDER — BUPROPION HYDROCHLORIDE 150 MG/1
150 TABLET ORAL DAILY
Status: CANCELLED | OUTPATIENT
Start: 2023-07-27

## 2023-07-26 RX ORDER — CLOPIDOGREL BISULFATE 75 MG/1
75 TABLET ORAL DAILY
Status: DISCONTINUED | OUTPATIENT
Start: 2023-07-27 | End: 2023-07-26 | Stop reason: HOSPADM

## 2023-07-26 RX ORDER — ISOSORBIDE MONONITRATE 30 MG/1
30 TABLET, EXTENDED RELEASE ORAL DAILY
Status: DISCONTINUED | OUTPATIENT
Start: 2023-07-26 | End: 2023-07-26

## 2023-07-26 RX ORDER — FORMOTEROL FUMARATE 20 UG/2ML
20 SOLUTION RESPIRATORY (INHALATION)
Status: DISCONTINUED | OUTPATIENT
Start: 2023-07-27 | End: 2023-07-31 | Stop reason: HOSPADM

## 2023-07-26 RX ORDER — FORMOTEROL FUMARATE 20 UG/2ML
20 SOLUTION RESPIRATORY (INHALATION)
Status: CANCELLED | OUTPATIENT
Start: 2023-07-26

## 2023-07-26 RX ORDER — ASPIRIN 81 MG/1
81 TABLET, CHEWABLE ORAL DAILY
Status: DISCONTINUED | OUTPATIENT
Start: 2023-07-27 | End: 2023-07-31 | Stop reason: HOSPADM

## 2023-07-26 RX ORDER — GUAIFENESIN 600 MG/1
600 TABLET, EXTENDED RELEASE ORAL EVERY 12 HOURS SCHEDULED
Status: DISCONTINUED | OUTPATIENT
Start: 2023-07-27 | End: 2023-07-31 | Stop reason: HOSPADM

## 2023-07-26 RX ORDER — HEPARIN SODIUM 1000 [USP'U]/ML
2000 INJECTION, SOLUTION INTRAVENOUS; SUBCUTANEOUS EVERY 6 HOURS PRN
Status: DISCONTINUED | OUTPATIENT
Start: 2023-07-26 | End: 2023-07-27

## 2023-07-26 RX ORDER — METHYLPREDNISOLONE SODIUM SUCCINATE 40 MG/ML
40 INJECTION, POWDER, LYOPHILIZED, FOR SOLUTION INTRAMUSCULAR; INTRAVENOUS EVERY 8 HOURS SCHEDULED
Status: CANCELLED | OUTPATIENT
Start: 2023-07-26

## 2023-07-26 RX ORDER — CEFTRIAXONE 1 G/50ML
1000 INJECTION, SOLUTION INTRAVENOUS EVERY 24 HOURS
Status: CANCELLED | OUTPATIENT
Start: 2023-07-26

## 2023-07-26 RX ORDER — LEVALBUTEROL INHALATION SOLUTION 0.63 MG/3ML
0.63 SOLUTION RESPIRATORY (INHALATION)
Status: CANCELLED | OUTPATIENT
Start: 2023-07-26

## 2023-07-26 RX ORDER — DIPHENHYDRAMINE HYDROCHLORIDE 50 MG/ML
25 INJECTION INTRAMUSCULAR; INTRAVENOUS EVERY 6 HOURS PRN
Status: CANCELLED | OUTPATIENT
Start: 2023-07-26

## 2023-07-26 RX ORDER — BUDESONIDE 0.5 MG/2ML
0.5 INHALANT ORAL
Status: CANCELLED | OUTPATIENT
Start: 2023-07-26

## 2023-07-26 RX ORDER — HEPARIN SODIUM 1000 [USP'U]/ML
4000 INJECTION, SOLUTION INTRAVENOUS; SUBCUTANEOUS EVERY 6 HOURS PRN
Status: CANCELLED | OUTPATIENT
Start: 2023-07-26

## 2023-07-26 RX ORDER — INSULIN LISPRO 100 [IU]/ML
1-5 INJECTION, SOLUTION INTRAVENOUS; SUBCUTANEOUS
Status: DISCONTINUED | OUTPATIENT
Start: 2023-07-26 | End: 2023-07-27

## 2023-07-26 RX ORDER — RANOLAZINE 500 MG/1
500 TABLET, EXTENDED RELEASE ORAL 2 TIMES DAILY
Status: DISCONTINUED | OUTPATIENT
Start: 2023-07-27 | End: 2023-07-31 | Stop reason: HOSPADM

## 2023-07-26 RX ORDER — CALCIUM CARBONATE 500 MG/1
500 TABLET, CHEWABLE ORAL 3 TIMES DAILY PRN
Status: DISCONTINUED | OUTPATIENT
Start: 2023-07-26 | End: 2023-07-31 | Stop reason: HOSPADM

## 2023-07-26 RX ORDER — LEVALBUTEROL INHALATION SOLUTION 1.25 MG/3ML
1.25 SOLUTION RESPIRATORY (INHALATION) EVERY 6 HOURS PRN
Status: DISCONTINUED | OUTPATIENT
Start: 2023-07-26 | End: 2023-07-26 | Stop reason: HOSPADM

## 2023-07-26 RX ORDER — NITROGLYCERIN 20 MG/100ML
5-200 INJECTION INTRAVENOUS
Status: DISCONTINUED | OUTPATIENT
Start: 2023-07-26 | End: 2023-07-27

## 2023-07-26 RX ORDER — HEPARIN SODIUM 10000 [USP'U]/100ML
3-20 INJECTION, SOLUTION INTRAVENOUS
Status: CANCELLED | OUTPATIENT
Start: 2023-07-26

## 2023-07-26 RX ORDER — NITROGLYCERIN 0.4 MG/1
0.4 TABLET SUBLINGUAL
Status: DISCONTINUED | OUTPATIENT
Start: 2023-07-26 | End: 2023-07-31 | Stop reason: HOSPADM

## 2023-07-26 RX ORDER — LEVALBUTEROL INHALATION SOLUTION 0.63 MG/3ML
0.63 SOLUTION RESPIRATORY (INHALATION)
Status: DISCONTINUED | OUTPATIENT
Start: 2023-07-27 | End: 2023-07-29

## 2023-07-26 RX ORDER — CLOPIDOGREL BISULFATE 75 MG/1
75 TABLET ORAL DAILY
Status: DISCONTINUED | OUTPATIENT
Start: 2023-07-27 | End: 2023-07-27

## 2023-07-26 RX ORDER — ISOSORBIDE MONONITRATE 30 MG/1
60 TABLET, EXTENDED RELEASE ORAL DAILY
Status: DISCONTINUED | OUTPATIENT
Start: 2023-07-27 | End: 2023-07-26

## 2023-07-26 RX ORDER — ATENOLOL 50 MG/1
50 TABLET ORAL DAILY
Status: DISCONTINUED | OUTPATIENT
Start: 2023-07-27 | End: 2023-07-31 | Stop reason: HOSPADM

## 2023-07-26 RX ORDER — HEPARIN SODIUM 10000 [USP'U]/100ML
3-20 INJECTION, SOLUTION INTRAVENOUS
Status: DISCONTINUED | OUTPATIENT
Start: 2023-07-26 | End: 2023-07-27

## 2023-07-26 RX ORDER — CLOPIDOGREL BISULFATE 75 MG/1
600 TABLET ORAL ONCE
Status: COMPLETED | OUTPATIENT
Start: 2023-07-26 | End: 2023-07-26

## 2023-07-26 RX ORDER — INSULIN LISPRO 100 [IU]/ML
1-6 INJECTION, SOLUTION INTRAVENOUS; SUBCUTANEOUS
Status: DISCONTINUED | OUTPATIENT
Start: 2023-07-27 | End: 2023-07-27

## 2023-07-26 RX ORDER — BUPROPION HYDROCHLORIDE 150 MG/1
150 TABLET ORAL DAILY
Status: DISCONTINUED | OUTPATIENT
Start: 2023-07-27 | End: 2023-07-31 | Stop reason: HOSPADM

## 2023-07-26 RX ADMIN — ISOSORBIDE MONONITRATE 30 MG: 30 TABLET, EXTENDED RELEASE ORAL at 08:01

## 2023-07-26 RX ADMIN — FORMOTEROL FUMARATE DIHYDRATE 20 MCG: 20 SOLUTION RESPIRATORY (INHALATION) at 07:28

## 2023-07-26 RX ADMIN — INSULIN LISPRO 3 UNITS: 100 INJECTION, SOLUTION INTRAVENOUS; SUBCUTANEOUS at 23:26

## 2023-07-26 RX ADMIN — BUDESONIDE 0.5 MG: 0.5 INHALANT ORAL at 07:28

## 2023-07-26 RX ADMIN — HEPARIN SODIUM 4000 UNITS: 1000 INJECTION INTRAVENOUS; SUBCUTANEOUS at 05:08

## 2023-07-26 RX ADMIN — LEVALBUTEROL HYDROCHLORIDE 0.63 MG: 0.63 SOLUTION RESPIRATORY (INHALATION) at 07:28

## 2023-07-26 RX ADMIN — RANOLAZINE 500 MG: 500 TABLET, EXTENDED RELEASE ORAL at 08:01

## 2023-07-26 RX ADMIN — ASPIRIN 81 MG CHEWABLE TABLET 81 MG: 81 TABLET CHEWABLE at 15:18

## 2023-07-26 RX ADMIN — FORMOTEROL FUMARATE DIHYDRATE 20 MCG: 20 SOLUTION RESPIRATORY (INHALATION) at 20:31

## 2023-07-26 RX ADMIN — INSULIN LISPRO 4 UNITS: 100 INJECTION, SOLUTION INTRAVENOUS; SUBCUTANEOUS at 11:38

## 2023-07-26 RX ADMIN — NITROGLYCERIN 5 MCG/MIN: 20 INJECTION INTRAVENOUS at 23:22

## 2023-07-26 RX ADMIN — INSULIN LISPRO 3 UNITS: 100 INJECTION, SOLUTION INTRAVENOUS; SUBCUTANEOUS at 16:19

## 2023-07-26 RX ADMIN — HEPARIN SODIUM 4000 UNITS: 1000 INJECTION INTRAVENOUS; SUBCUTANEOUS at 11:38

## 2023-07-26 RX ADMIN — METHYLPREDNISOLONE SODIUM SUCCINATE 40 MG: 40 INJECTION, POWDER, FOR SOLUTION INTRAMUSCULAR; INTRAVENOUS at 13:23

## 2023-07-26 RX ADMIN — RANOLAZINE 500 MG: 500 TABLET, EXTENDED RELEASE ORAL at 17:04

## 2023-07-26 RX ADMIN — BUDESONIDE 0.5 MG: 0.5 INHALANT ORAL at 20:31

## 2023-07-26 RX ADMIN — LEVALBUTEROL HYDROCHLORIDE 0.63 MG: 0.63 SOLUTION RESPIRATORY (INHALATION) at 20:31

## 2023-07-26 RX ADMIN — IPRATROPIUM BROMIDE 0.5 MG: 0.5 SOLUTION RESPIRATORY (INHALATION) at 20:31

## 2023-07-26 RX ADMIN — HEPARIN SODIUM 19.8 UNITS/KG/HR: 10000 INJECTION, SOLUTION INTRAVENOUS at 17:06

## 2023-07-26 RX ADMIN — CALCIUM CARBONATE (ANTACID) CHEW TAB 500 MG 500 MG: 500 CHEW TAB at 23:19

## 2023-07-26 RX ADMIN — IPRATROPIUM BROMIDE 0.5 MG: 0.5 SOLUTION RESPIRATORY (INHALATION) at 13:48

## 2023-07-26 RX ADMIN — GUAIFENESIN 600 MG: 600 TABLET ORAL at 08:01

## 2023-07-26 RX ADMIN — INSULIN LISPRO 2 UNITS: 100 INJECTION, SOLUTION INTRAVENOUS; SUBCUTANEOUS at 07:59

## 2023-07-26 RX ADMIN — CEFTRIAXONE 1000 MG: 1 INJECTION, SOLUTION INTRAVENOUS at 16:20

## 2023-07-26 RX ADMIN — IPRATROPIUM BROMIDE 0.5 MG: 0.5 SOLUTION RESPIRATORY (INHALATION) at 07:28

## 2023-07-26 RX ADMIN — METHYLPREDNISOLONE SODIUM SUCCINATE 40 MG: 40 INJECTION, POWDER, FOR SOLUTION INTRAMUSCULAR; INTRAVENOUS at 23:23

## 2023-07-26 RX ADMIN — HEPARIN SODIUM 2000 UNITS: 1000 INJECTION INTRAVENOUS; SUBCUTANEOUS at 18:00

## 2023-07-26 RX ADMIN — ATORVASTATIN CALCIUM 40 MG: 40 TABLET, FILM COATED ORAL at 16:19

## 2023-07-26 RX ADMIN — BUPROPION HYDROCHLORIDE 150 MG: 150 TABLET, EXTENDED RELEASE ORAL at 08:01

## 2023-07-26 RX ADMIN — METHYLPREDNISOLONE SODIUM SUCCINATE 40 MG: 40 INJECTION, POWDER, FOR SOLUTION INTRAMUSCULAR; INTRAVENOUS at 05:08

## 2023-07-26 RX ADMIN — ATENOLOL 50 MG: 50 TABLET ORAL at 08:01

## 2023-07-26 RX ADMIN — LEVALBUTEROL HYDROCHLORIDE 0.63 MG: 0.63 SOLUTION RESPIRATORY (INHALATION) at 13:48

## 2023-07-26 RX ADMIN — HEPARIN SODIUM 4000 UNITS: 1000 INJECTION INTRAVENOUS; SUBCUTANEOUS at 23:57

## 2023-07-26 RX ADMIN — NITROGLYCERIN 10 MCG/MIN: 20 INJECTION INTRAVENOUS at 17:24

## 2023-07-26 RX ADMIN — HEPARIN SODIUM 20 UNITS/KG/HR: 10000 INJECTION, SOLUTION INTRAVENOUS at 23:18

## 2023-07-26 RX ADMIN — CLOPIDOGREL BISULFATE 600 MG: 75 TABLET ORAL at 15:18

## 2023-07-26 RX ADMIN — AZITHROMYCIN MONOHYDRATE 500 MG: 500 INJECTION, POWDER, LYOPHILIZED, FOR SOLUTION INTRAVENOUS at 15:18

## 2023-07-26 NOTE — ASSESSMENT & PLAN NOTE
Assessment:  · By history  · Lipid panel this admission triglycerides-117, HDL-19, LDL-49  · Outpatient is on Crestor    Plan:  · Continue with atorvastatin
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
## 2023-07-26 NOTE — PLAN OF CARE
Problem: PAIN - ADULT  Goal: Verbalizes/displays adequate comfort level or baseline comfort level  Description: Interventions:  - Encourage patient to monitor pain and request assistance  - Assess pain using appropriate pain scale  - Administer analgesics based on type and severity of pain and evaluate response  - Implement non-pharmacological measures as appropriate and evaluate response  - Consider cultural and social influences on pain and pain management  - Notify physician/advanced practitioner if interventions unsuccessful or patient reports new pain  Outcome: Progressing     Problem: SAFETY ADULT  Goal: Patient will remain free of falls  Description: INTERVENTIONS:  - Educate patient/family on patient safety including physical limitations  - Instruct patient to call for assistance with activity   - Consult OT/PT to assist with strengthening/mobility   - Keep Call bell within reach  - Keep bed low and locked with side rails adjusted as appropriate  - Keep care items and personal belongings within reach  - Initiate and maintain comfort rounds  - Make Fall Risk Sign visible to staff  - Offer Toileting every x Hours, in advance of need  - Initiate/Maintain xalarm  - Obtain necessary fall risk management equipment: x  - Apply yellow socks and bracelet for high fall risk patients  - Consider moving patient to room near nurses station  Outcome: Progressing  Goal: Maintain or return to baseline ADL function  Description: INTERVENTIONS:  -  Assess patient's ability to carry out ADLs; assess patient's baseline for ADL function and identify physical deficits which impact ability to perform ADLs (bathing, care of mouth/teeth, toileting, grooming, dressing, etc.)  - Assess/evaluate cause of self-care deficits   - Assess range of motion  - Assess patient's mobility; develop plan if impaired  - Assess patient's need for assistive devices and provide as appropriate  - Encourage maximum independence but intervene and supervise when necessary  - Involve family in performance of ADLs  - Assess for home care needs following discharge   - Consider OT consult to assist with ADL evaluation and planning for discharge  - Provide patient education as appropriate  Outcome: Progressing  Goal: Maintains/Returns to pre admission functional level  Description: INTERVENTIONS:  - Perform BMAT or MOVE assessment daily.   - Set and communicate daily mobility goal to care team and patient/family/caregiver. - Collaborate with rehabilitation services on mobility goals if consulted  - Perform Range of Motion x times a day. - Reposition patient every x hours. - Dangle patient x times a day  - Stand patient x times a day  - Ambulate patient x times a day  - Out of bed to chair x times a day   - Out of bed for meals x times a day  - Out of bed for toileting  - Record patient progress and toleration of activity level   Outcome: Progressing     Problem: DISCHARGE PLANNING  Goal: Discharge to home or other facility with appropriate resources  Description: INTERVENTIONS:  - Identify barriers to discharge w/patient and caregiver  - Arrange for needed discharge resources and transportation as appropriate  - Identify discharge learning needs (meds, wound care, etc.)  - Arrange for interpretive services to assist at discharge as needed  - Refer to Case Management Department for coordinating discharge planning if the patient needs post-hospital services based on physician/advanced practitioner order or complex needs related to functional status, cognitive ability, or social support system  Outcome: Progressing     Problem: Knowledge Deficit  Goal: Patient/family/caregiver demonstrates understanding of disease process, treatment plan, medications, and discharge instructions  Description: Complete learning assessment and assess knowledge base.   Interventions:  - Provide teaching at level of understanding  - Provide teaching via preferred learning methods  Outcome: Progressing     Problem: CARDIOVASCULAR - ADULT  Goal: Maintains optimal cardiac output and hemodynamic stability  Description: INTERVENTIONS:  - Monitor I/O, vital signs and rhythm  - Monitor for S/S and trends of decreased cardiac output  - Administer and titrate ordered vasoactive medications to optimize hemodynamic stability  - Assess quality of pulses, skin color and temperature  - Assess for signs of decreased coronary artery perfusion  - Instruct patient to report change in severity of symptoms  Outcome: Progressing  Goal: Absence of cardiac dysrhythmias or at baseline rhythm  Description: INTERVENTIONS:  - Continuous cardiac monitoring, vital signs, obtain 12 lead EKG if ordered  - Administer antiarrhythmic and heart rate control medications as ordered  - Monitor electrolytes and administer replacement therapy as ordered  Outcome: Progressing     Problem: RESPIRATORY - ADULT  Goal: Achieves optimal ventilation and oxygenation  Description: INTERVENTIONS:  - Assess for changes in respiratory status  - Assess for changes in mentation and behavior  - Position to facilitate oxygenation and minimize respiratory effort  - Oxygen administered by appropriate delivery if ordered  - Initiate smoking cessation education as indicated  - Encourage broncho-pulmonary hygiene including cough, deep breathe, Incentive Spirometry  - Assess the need for suctioning and aspirate as needed  - Assess and instruct to report SOB or any respiratory difficulty  - Respiratory Therapy support as indicated  Outcome: Progressing     Problem: METABOLIC, FLUID AND ELECTROLYTES - ADULT  Goal: Electrolytes maintained within normal limits  Description: INTERVENTIONS:  - Monitor labs and assess patient for signs and symptoms of electrolyte imbalances  - Administer electrolyte replacement as ordered  - Monitor response to electrolyte replacements, including repeat lab results as appropriate  - Instruct patient on fluid and nutrition as appropriate  Outcome: Progressing  Goal: Fluid balance maintained  Description: INTERVENTIONS:  - Monitor labs   - Monitor I/O and WT  - Instruct patient on fluid and nutrition as appropriate  - Assess for signs & symptoms of volume excess or deficit  Outcome: Progressing     Problem: SKIN/TISSUE INTEGRITY - ADULT  Goal: Skin Integrity remains intact(Skin Breakdown Prevention)  Description: Assess:  -Perform Mitch assessment every x  -Clean and moisturize skin every x  -Inspect skin when repositioning, toileting, and assisting with ADLS  -Assess under medical devices such as x every x  -Assess extremities for adequate circulation and sensation     Bed Management:  -Have minimal linens on bed & keep smooth, unwrinkled  -Change linens as needed when moist or perspiring  -Avoid sitting or lying in one position for more than x hours while in bed  -Keep HOB at [facility]     Toileting:  -Offer bedside commode  -Assess for incontinence every x  -Use incontinent care products after each incontinent episode such as x    Activity:  -Mobilize patient x times a day  -Encourage activity and walks on unit  -Encourage or provide ROM exercises   -Turn and reposition patient every x Hours  -Use appropriate equipment to lift or move patient in bed  -Instruct/ Assist with weight shifting every x when out of bed in chair  -Consider limitation of chair time x hour intervals    Skin Care:  -Avoid use of baby powder, tape, friction and shearing, hot water or constrictive clothing  -Relieve pressure over bony prominences using x  -Do not massage red bony areas    Next Steps:  -Teach patient strategies to minimize risks such as x   -Consider consults to  interdisciplinary teams such as x  Outcome: Progressing     Problem: HEMATOLOGIC - ADULT  Goal: Maintains hematologic stability  Description: INTERVENTIONS  - Assess for signs and symptoms of bleeding or hemorrhage  - Monitor labs  - Administer supportive blood products/factors as ordered and appropriate  Outcome: Progressing     Problem: MUSCULOSKELETAL - ADULT  Goal: Maintain or return mobility to safest level of function  Description: INTERVENTIONS:  - Assess patient's ability to carry out ADLs; assess patient's baseline for ADL function and identify physical deficits which impact ability to perform ADLs (bathing, care of mouth/teeth, toileting, grooming, dressing, etc.)  - Assess/evaluate cause of self-care deficits   - Assess range of motion  - Assess patient's mobility  - Assess patient's need for assistive devices and provide as appropriate  - Encourage maximum independence but intervene and supervise when necessary  - Involve family in performance of ADLs  - Assess for home care needs following discharge   - Consider OT consult to assist with ADL evaluation and planning for discharge  - Provide patient education as appropriate  Outcome: Progressing     Problem: Nutrition/Hydration-ADULT  Goal: Nutrient/Hydration intake appropriate for improving, restoring or maintaining nutritional needs  Description: Monitor and assess patient's nutrition/hydration status for malnutrition. Collaborate with interdisciplinary team and initiate plan and interventions as ordered. Monitor patient's weight and dietary intake as ordered or per policy. Utilize nutrition screening tool and intervene as necessary. Determine patient's food preferences and provide high-protein, high-caloric foods as appropriate.      INTERVENTIONS:  - Monitor oral intake, urinary output, labs, and treatment plans  - Assess nutrition and hydration status and recommend course of action  - Evaluate amount of meals eaten  - Assist patient with eating if necessary   - Allow adequate time for meals  - Recommend/ encourage appropriate diets, oral nutritional supplements, and vitamin/mineral supplements  - Order, calculate, and assess calorie counts as needed  - Recommend, monitor, and adjust tube feedings and TPN/PPN based on assessed needs  - Assess need for intravenous fluids  - Provide specific nutrition/hydration education as appropriate  - Include patient/family/caregiver in decisions related to nutrition  Outcome: Progressing     Problem: INFECTION - ADULT  Goal: Absence or prevention of progression during hospitalization  Description: INTERVENTIONS:  - Assess and monitor for signs and symptoms of infection  - Monitor lab/diagnostic results  - Monitor all insertion sites, i.e. indwelling lines, tubes, and drains  - Monitor endotracheal if appropriate and nasal secretions for changes in amount and color  - [place] appropriate cooling/warming therapies per order  - Administer medications as ordered  - Instruct and encourage patient and family to use good hand hygiene technique  - Identify and instruct in appropriate isolation precautions for identified infection/condition  Outcome: Progressing     Problem: Prexisting or High Potential for Compromised Skin Integrity  Goal: Skin integrity is maintained or improved  Description: INTERVENTIONS:  - Identify patients at risk for skin breakdown  - Assess and monitor skin integrity  - Assess and monitor nutrition and hydration status  - Monitor labs   - Assess for incontinence   - Turn and reposition patient  - Assist with mobility/ambulation  - Relieve pressure over bony prominences  - Avoid friction and shearing  - Provide appropriate hygiene as needed including keeping skin clean and dry  - Evaluate need for skin moisturizer/barrier cream  - Collaborate with interdisciplinary team   - Patient/family teaching  - Consider wound care consult   Outcome: Progressing     Problem: Potential for Falls  Goal: Patient will remain free of falls  Description: INTERVENTIONS:  - Educate patient/family on patient safety including physical limitations  - Instruct patient to call for assistance with activity   - Consult OT/PT to assist with strengthening/mobility   - Keep Call bell within reach  - Keep bed low and locked with side rails adjusted as appropriate  - Keep care items and personal belongings within reach  - Initiate and maintain comfort rounds  - Make Fall Risk Sign visible to staff  - Offer Toileting every x Hours, in advance of need  - Initiate/Maintain xalarm  - Obtain necessary fall risk management equipment: x  - Apply yellow socks and bracelet for high fall risk patients  - Consider moving patient to room near nurses station  Outcome: Progressing     Problem: MOBILITY - ADULT  Goal: Maintain or return to baseline ADL function  Description: INTERVENTIONS:  -  Assess patient's ability to carry out ADLs; assess patient's baseline for ADL function and identify physical deficits which impact ability to perform ADLs (bathing, care of mouth/teeth, toileting, grooming, dressing, etc.)  - Assess/evaluate cause of self-care deficits   - Assess range of motion  - Assess patient's mobility; develop plan if impaired  - Assess patient's need for assistive devices and provide as appropriate  - Encourage maximum independence but intervene and supervise when necessary  - Involve family in performance of ADLs  - Assess for home care needs following discharge   - Consider OT consult to assist with ADL evaluation and planning for discharge  - Provide patient education as appropriate  Outcome: Progressing  Goal: Maintains/Returns to pre admission functional level  Description: INTERVENTIONS:  - Perform BMAT or MOVE assessment daily.   - Set and communicate daily mobility goal to care team and patient/family/caregiver. - Collaborate with rehabilitation services on mobility goals if consulted  - Perform Range of Motion x times a day. - Reposition patient every x hours.   - Dangle patient x times a day  - Stand patient x times a day  - Ambulate patient x times a day  - Out of bed to chair x times a day   - Out of bed for meals x times a day  - Out of bed for toileting  - Record patient progress and toleration of activity level   Outcome: Progressing

## 2023-07-26 NOTE — ASSESSMENT & PLAN NOTE
· SIRS: WBC 16, tachypnea   · Lactic and procal WNL   · Initially presumed COPD exacerbation versus pneumonia, suspect NSTEMI as source  · CXR: Small lingular opacity may represent pneumonia. · CTA chest: Pulmonary scarring noted left upper lobe especially within the lingular segment.  Mild dependent changes noted at the lung bases  · UA pending   · BC x 2 - 1/2 positive for gram-positive cocci in clusters (suspect contaminant)  · Repeats pending  · Initially started on empiric treatment for pneumonia, initial procalcitonin negative, repeat pending, if negative, discontinue antibiotics
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
## 2023-07-26 NOTE — QUICK NOTE
Patient had reported to day team about having chest pain. Initial troponin 65. 2 Hour troponin now resulting at 512. Delta 447. Reached out to on-call cardiologist about rising troponins, CP pain and EKG. Recommending holding home Eliquis and initiating IV heparin drip. Patient had received full dose asa in the ER. Continue to monitor troponins.      Cardiology will see in consult in the morning

## 2023-07-26 NOTE — ASSESSMENT & PLAN NOTE
Assessment:    · History of CAD with CABG x 3 stents placed at [address]  · Had a LHC in November of 2021, not requiring PCI at that time. · Last seen by cardiology March of 2023, at which time the patient had reported ongoing intermittent exertional midsternal chest pain  · Out patient patient takes atenolol, Imdur, Ranexa for angina  · In the ED the patient complained of Chest tightness that resolved after duoneb treatment. Also given ASA  · Troponin 65 ->512->902->889->755->832->686  · Started on Heparin drip  · Cardiology Consulted: Patient loaded with Plavix  · Currently not complaining of CP    Plan:  · Pending transfer to [facility] for LHC  · Continue with Heparin drip  · Continue with daily ASA, will start Plavix 75 mg Qday tomorrow  · Continue home dose atenolol, Ranexa, and Imdur  · Upgraded to SD1 per cardiology request, patient to start on Nitro drip atypical anginal s/s  · Plan to start Nitro drip at 5 mcg an assess patient response. Will discontinue if patient starts complaining of headache or becomes hypotensive.    · Cardiology following, appreciate recommendations

## 2023-07-26 NOTE — ASSESSMENT & PLAN NOTE
Assessment:  · By history. Currently SR-SB with a rate of 58 BPM  · On Eliquis OP    Plan:  · Continue home dose atenolol for rate reduction  · Holding Eliquis in the setting of ACS.    · Continue with Heparin drip, titrate to protocol

## 2023-07-26 NOTE — ASSESSMENT & PLAN NOTE
Assessment:  · Presented with a 3 day history of SOB, headache, chills, night sweats, and CP  · Requiring 4 L NC  · CXR in the ED was significant for small lingular opacities which may represent pneumonia  · CTA of the chest without evidence of PE or other acute pulmonary findings. Pulmonary scarring noted in the left upper lobe within the lingular segment.   · BNP on admission was 238  · COVID/flu/RSV negative  · Intermittent expiratory wheezing on exam    Plan:  · Continue supplemental O2 to maintain SPO2 greater than 92%  · As needed Xopenex for shortness of breath and wheezing

## 2023-07-26 NOTE — PLAN OF CARE
Problem: PAIN - ADULT  Goal: Verbalizes/displays adequate comfort level or baseline comfort level  Description: Interventions:  - Encourage patient to monitor pain and request assistance  - Assess pain using appropriate pain scale  - Administer analgesics based on type and severity of pain and evaluate response  - Implement non-pharmacological measures as appropriate and evaluate response  - Consider cultural and social influences on pain and pain management  - Notify physician/advanced practitioner if interventions unsuccessful or patient reports new pain  Outcome: Progressing     Problem: CARDIOVASCULAR - ADULT  Goal: Maintains optimal cardiac output and hemodynamic stability  Description: INTERVENTIONS:  - Monitor I/O, vital signs and rhythm  - Monitor for S/S and trends of decreased cardiac output  - Administer and titrate ordered vasoactive medications to optimize hemodynamic stability  - Assess quality of pulses, skin color and temperature  - Assess for signs of decreased coronary artery perfusion  - Instruct patient to report change in severity of symptoms  Outcome: Progressing  Goal: Absence of cardiac dysrhythmias or at baseline rhythm  Description: INTERVENTIONS:  - Continuous cardiac monitoring, vital signs, obtain 12 lead EKG if ordered  - Administer antiarrhythmic and heart rate control medications as ordered  - Monitor electrolytes and administer replacement therapy as ordered  Outcome: Progressing     Problem: RESPIRATORY - ADULT  Goal: Achieves optimal ventilation and oxygenation  Description: INTERVENTIONS:  - Assess for changes in respiratory status  - Assess for changes in mentation and behavior  - Position to facilitate oxygenation and minimize respiratory effort  - Oxygen administered by appropriate delivery if ordered  - Initiate smoking cessation education as indicated  - Encourage broncho-pulmonary hygiene including cough, deep breathe, Incentive Spirometry  - Assess the need for suctioning and aspirate as needed  - Assess and instruct to report SOB or any respiratory difficulty  - Respiratory Therapy support as indicated  Outcome: Progressing

## 2023-07-26 NOTE — TRANSPORTATION MEDICAL NECESSITY
Section I - General Information    Name of Patient: Pam Whitehead                 : 1960    Medicare #: [id number]  Transport Date: 23 (PCS is valid for round trips on this date and for all repetitive trips in the 60-day range as noted below.)  Origin: [facility]                                                         Destination: [facility]  Is the pt's stay covered under Medicare Part A (PPS/DRG)   []     Closest appropriate facility? If no, why is transport to more distant facility required? Yes  If hospice pt, is this transport related to pt's terminal illness? NA       Section II - Medical Necessity Questionnaire  Ambulance transportation is medically necessary only if other means of transport are contraindicated or would be potentially harmful to the patient. To meet this requirement, the patient must either be "bed confined" or suffer from a condition such that transport by means other than ambulance is contraindicated by the patient's condition. The following questions must be answered by the medical professional signing below for this form to be valid:    1)  Describe the MEDICAL CONDITION (physical and/or mental) of this patient  [address] that requires the patient to be transported in an ambulance and why transport by other means is contraindicated by the patient's condition:PT  Is is need of tele and on 2L O2    2) Is the patient "bed confined" as defined below? Yes  To be "be confined" the patient must satisfy all three of the following conditions: (1) unable to get up from bed without Assistance; AND (2) unable to ambulate; AND (3) unable to sit in a chair or wheelchair. 3) Can this patient safely be transported by car or wheelchair van (i.e., seated during transport without a medical attendant or monitoring)?    No    4) In addition to completing questions 1-3 above, please check any of the following conditions that apply*:   *Note: supporting documentation for any boxes checked must be maintained in the patient's medical records. If hosp-hosp transfer, describe services needed at 2nd facility not available at 1st facility? Medical attendant required   Requires oxygen-unable to self administer  Cardiac monitoring required en route       Section III - Signature of Physician or Healthcare Professional  I certify that the above information is true and correct based on my evaluation of this patient, and represent that the patient requires transport by ambulance and that other forms of transport are contraindicated. I understand that this information will be used by the Centers for Medicare and Medicaid Services (CMS) to support the determination of medical necessity for ambulance services, and I represent that I have personal knowledge of the patient's condition at time of transport. []  If this box is checked, I also certify that the patient is physically or mentally incapable of signing the ambulance service's claim and that the institution with which I am affiliated has furnished care, services, or assistance to the patient. My signature below is made on behalf of the patient pursuant to 42 CFR §424.36(b)(4). In accordance with 42 CFR §424.37, the specific reason(s) that the patient is physically or mentally incapable of signing the claim form is as follows: Georges Conroy of Physician* or Healthcare Professional______________________________________________________________  Signature Date 07/26/23 (For scheduled repetitive transports, this form is not valid for transports performed more than 60 days after this date)    Printed Name & Credentials of Physician or Healthcare Professional (MD, , RN, etc.)___Shanique Pepper RN___  *Form must be signed by patient's attending physician for scheduled, repetitive transports.  For non-repetitive, unscheduled ambulance transports, if unable to obtain the signature of the attending physician, any of the following may sign (choose appropriate option below)  [] Physician Assistant []  Clinical Nurse Specialist []  Registered Nurse  []  Nurse Practitioner  [x] Discharge Planner

## 2023-07-26 NOTE — CONSULTS
Consult - Cardiology   Fariha Antony 60 y.o. male MRN: [id number]  Unit/Bed#: -01 Encounter: [id number]    Reason For Consult: Chest pain, elevated troponins  Outpatient Cardiologist: Dr Carroll/ Dr Fausto Last:  1. Chest pain/ tightness/ dyspnea  a. Constant lasting 48 hours total > patient with notable contractions and wheezing on exam.  Improved after breathing treatments and supplemental O2.  b. Currently on 2L O2 via nasal cannula, SPO2 91-99%   2. Elevated troponins  a. Trop trend: 65>512>902>755>832  b. Continue trending  c. On IV heparin per ACS protocol  3. Hx of CAD/CABG x3 in 2014 performed in [address]  a. Follows with cardiologist in [place], last [facility] 3/28/2023  b. Underwent LHC without need for PCI November 2021  c. On Ranexa 500 mg twice daily, Imdur 30 mg daily, atenolol 50 mg daily, Crestor 20 mg daily. d. Not on ASA due to increased bleeding risk with Eliquis  e. Admitted to [address] [place] [address] 7/25/2023 --Eliquis switched to IV heparin per ACS protocol  f. EKG shows SR with PVCs and nonspecific ST changes  g. Chest tightness resolved with breathing treatments/ no SL NTG given thus far  h. Repeat TTE pending  4. Hx of paroxysmal atrial fibrillation s/p ablation  a. On Eliquis 5 mg twice daily  b. On atenolol 50 mg daily  5. 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 Type II DM -A1C 8.4%  7. Tobacco abuse    PLAN/ DISCUSSION:     • Obtain TTE to assess EF and regional wall motion abnormalities. • Continue trending troponin and continue IV heparin gtt  • Inpatient telemetry  • Serial EKGs PRN with the onset of cardiac symptoms  • SL NTG ordered as needed  • Attempt to obtain prior cardiology studies from his primary cardiologist office. • ASA 325mg loaded in the ED; start ASA 81mg today.    • Order Plavix 600mg x1 followed by 75 mg from tomorrow  • Additional cardiac Rx as above  • Plan for transfer to cath capable facility for definitive assessment of his coronary anatomy; likely [facility] based on bed availability. DIAGNOSTIC DATA:      ECG:             History of Present Illness:  Gabe Hawkins is a 60y.o. year old male with past medical history significant for paroxysmal atrial fibrillation status post ablation 9/2019, CAD s/p CABG x3, hyperlipidemia, type II DM, COPD secondary to smoking inhalation from 9/11 exposure who presents with complaint of shortness of breath, diaphoresis, headache and sensation and feeling as if he could not fully expand his chest with breathing which occurred suddenly without provocation approximately 48 hours ago. His symptoms have been constant since onset without exacerbating or relieving factors. He received CT PE study which was negative for acute PE and no acute pulmonary findings. His troponins were elevated with trend: 65>512>902>755>832. BNP mildly elevated 239. EKG shows SR with PVCs and nonspecific ST abnormalities. He reports significant improvement with breathing treatments in the ED and is feeling much better. He reports he is very anxious regarding his cardiac history and reports of significant family stressors over the past few years. Denies taking SL NTG pre-hospital. He typically follows with cardiologist in [place] but does visit [place] frequently as he has properties here. Per chart he underwent LHC November 2021 without indication for PCI (not available). He has history of chronic exertional chest pain over the last several months on atenolol, Imdur and Ranexa which have helped his symptoms in the past. He denies have chronic chest tightness due to COPD. He is not currently O2 dependant.        Past Medical History:        Past Medical History:   Diagnosis Date   • COPD (chronic obstructive pulmonary disease) ([address])    • Diabetes mellitus ([address])    • High cholesterol    • Hypertension       Past Surgical History:   Procedure Laterality Date   • CHOLECYSTECTOMY     • SHOULDER SURGERY          Allergy:        Allergies Allergen Reactions   • Penicillins Other (See Comments)     Unknown       Medications:       Prior to Admission medications    Medication Sig Start Date End Date Taking? Authorizing Provider   ALBUTEROL IN Inhale   Yes Historical Provider, MD   apixaban (Eliquis) 5 mg Take 5 mg by mouth 2 (two) times a day   Yes Historical Provider, MD   atenolol (TENORMIN) 50 mg tablet Take 50 mg by mouth daily   Yes Historical Provider, MD   buPROPion (ZYBAN) 150 MG 12 hr tablet Take 150 mg by mouth in the morning   Yes Historical Provider, MD   Dapagliflozin-metFORMIN HCl ER (Xigduo XR)  MG TB24 Take by mouth   Yes Historical Provider, MD   Fluticasone Furoate-Vilanterol (BREO ELLIPTA IN) Inhale   Yes Historical Provider, MD   isosorbide mononitrate (IMDUR) 30 mg 24 hr tablet Take 30 mg by mouth daily   Yes Historical Provider, MD   ranolazine (RANEXA) 500 mg 12 hr tablet Take 500 mg by mouth 2 (two) times a day   Yes Historical Provider, MD   rosuvastatin (CRESTOR) 20 MG tablet Take 20 mg by mouth daily   Yes Historical Provider, MD       Family History:     History reviewed. No pertinent family history.      Social History:       Social History     Socioeconomic History   • Marital status: Single     Spouse name: None   • Number of children: None   • Years of education: None   • Highest education level: None   Occupational History   • None   Tobacco Use   • Smoking status: Every Day     Packs/day: 0.25     Types: Cigarettes   • Smokeless tobacco: Never   Vaping Use   • Vaping Use: Never used   Substance and Sexual Activity   • Alcohol use: Never   • Drug use: Never   • Sexual activity: None   Other Topics Concern   • None   Social History Narrative   • None     Social Determinants of Health     Financial Resource Strain: Not on file   Food Insecurity: Not on file   Transportation Needs: Not on file   Physical Activity: Not on file   Stress: Not on file   Social Connections: Not on file   Intimate Partner Violence: Not on file   Housing Stability: Not on file       Weights/BMI:    Wt Readings from Last 3 Encounters:   07/26/23 87 kg (191 lb 12.8 oz)   07/30/22 89.4 kg (197 lb)   , Body mass index is 28.32 kg/m². ROS:  14 point ROS negative except as outlined above  Remainder review of systems is negative    Exam:  General: Alert, oriented and in no acute distress, cooperative  Head: Normocephalic, atraumatic. Eyes: PERRLA. No icterus. Normal Conjunctiva. Oropharynx: Moist and normal-appearing mucosa  Neck: Supple, symmetrical, trachea midline, JVD not appreciated.    Heart: 2/6 systolic murmur, no murmur, rub or gallop, S1 & S2 normal   Lungs: Normal air entry, lungs clear to auscultation and no rales, rhonchi or wheezing   Abdomen: Flat, normal findings: bowel sounds normal and soft, non-tender  Lower Limbs:  No pitting edema, 2+ peripheral pulses, capillary refill within normal limits  Musculoskeletal: ROM grossly normal

## 2023-07-26 NOTE — ASSESSMENT & PLAN NOTE
Hx of CAD s/p CABG & 3 stents. Last seen by OP Cardiology March 2023 at which time patient had reported ongoing intermittent exertional midsternal CP.    · Patient experienced brief CP in ED at time of second trop draw described as "chest tightness" which resolved with PO aspirin and duoneb  · Troponin peaked at 932, delta of 837  · EKG with ST depressions in inferior leads/V5/V4 --> appear slightly more pronounced than prior 2022 however noted with nonspecific ST and T wave changes in recent outpatient note as well   · Continue to monitor sx and trend trops/serial EKGs -->if no improvement, consult to cardiology  · Tele   · Continue home ranexa + imdur + atenolol  · Chest pain stable on heparin drip though patient diaphoretic  · Cardiology consulted  · Continue on heparin drip  · Start nitro drip given anginal equivalent  · Await transfer to [facility] for cath

## 2023-07-26 NOTE — NURSING NOTE
Pharmacy updated on Pt.'s status and not being transferred to SD unit at present moment. No IV Nitro gtt to be started at present time per [facility] And Cardiology. Pt. Updated on current care plan.

## 2023-07-26 NOTE — ASSESSMENT & PLAN NOTE
Lab Results   Component Value Date    HGBA1C 8.4 (H) 07/25/2023       Recent Labs     07/25/23  2108 07/26/23  0716 07/26/23  1122 07/26/23  1609   POCGLU 246* 191* 288* 240*       Blood Sugar Average: Last 72 hrs:  (P) 220.2     Assessment:  · By history.  Takes Xigduo XR OP  · HgbA1C 8.4    Plan:  · Blood glucose monitoring ACHS  · Goal blood glucose 140-180  · Continue with SSI  · DM counseling-encourage diet and exercise  · CHO controlled diet  · Hypoglycemic protocol

## 2023-07-26 NOTE — CASE MANAGEMENT
Case Management Assessment & Discharge Planning Note    Patient name Deo Aguilar  Location [address] [facility] 330/-01 MRN [id number]  : 1960 Date 2023       Current Admission Date: 2023  Current Admission Diagnosis:Acute respiratory failure with hypoxia [facility])   Patient Active Problem List    Diagnosis Date Noted   • Acute respiratory failure with hypoxia ([address]) 2023   • SIRS (systemic inflammatory response syndrome) ([address]) 2023   • Type 2 diabetes mellitus ([address]) 2023   • Atrial fibrillation ([address]) 2023   • Chest pain 2023   • COPD (chronic obstructive pulmonary disease) ([address]) 2023      LOS (days): 0  Geometric Mean LOS (GMLOS) (days):   Days to GMLOS:     OBJECTIVE:              Current admission status: Observation       Preferred Pharmacy:   [facility]/pharmacy #[id number] - [address] [address]  Phone: [phone] Fax: [phone]    Primary Care Provider: No primary care provider on file. Primary Insurance: MEDICARE  Secondary Insurance:     ASSESSMENT:  [facility] Representative - Brother   Primary Phone: [phone] (Mobile)               Advance Directives  Does patient have a [address] [place] [place] [address] Avenue?: No  Was patient offered paperwork?: Yes (pt reports being in the process of completing it.)  Does patient currently have a Health Care decision maker?: Yes, please see Health Care Proxy section  Does patient have Advance Directives?: No  Was patient offered paperwork?: Yes (pt reports being in the process of completing it.)    Readmission Root Cause  30 Day Readmission: No    Patient Information  Admitted from[de-identified] Home  Mental Status: Alert  During Assessment patient was accompanied by: Parent, Daughter, Brother  Assessment information provided by[de-identified] Patient  Primary Caregiver: Self  Support Systems: Family members  Home entry access options. Select all that apply. Jaleel Crowe Ramp  Type of Current Residence: 2 story home  Upon entering residence, is there a bedroom on the main floor (no further steps)?: No  A bedroom is located on the following floor levels of residence (select all that apply):: 2nd Floor  Upon entering residence, is there a bathroom on the main floor (no further steps)?: No  Indicate which floors of current residence have a bathroom (select all the apply):: 2nd Floor  Number of steps to 2nd floor from main floor: One Flight  In the last 12 months, was there a time when you were not able to pay the mortgage or rent on time?: No  In the last 12 months, how many places have you lived?: 1  In the last 12 months, was there a time when you did not have a steady place to sleep or slept in a shelter (including now)?: No  Homeless/housing insecurity resource given?: N/A  Living Arrangements: Lives Alone    Activities of Daily Living Prior to Admission  Functional Status: Independent  Completes ADLs independently?: Yes  Ambulates independently?: Yes  Does patient use assisted devices?: No  Does patient currently own DME?: No  Does patient have a history of Outpatient Therapy (PT/OT)?: No  Does the patient have a history of Short-Term Rehab?: No  Does patient have a history of HHC?: No  Does patient currently have [address]?: No    Patient Information Continued  Does patient have prescription coverage?: Yes  Within the past 12 months, you worried that your food would run out before you got the money to buy more.: Never true  Within the past 12 months, the food you bought just didn't last and you didn't have money to get more.: Never true  Food insecurity resource given?: N/A  Does patient receive dialysis treatments?: No  Does patient have a history of substance abuse?: No  Does patient have a history of Mental Health Diagnosis?: No    Means of Transportation  Means of Transport to Appts[de-identified] Drives Self  In the past 12 months, has lack of transportation kept you from medical appointments or from getting medications?: No  In the past 12 months, has lack of transportation kept you from meetings, work, or from getting things needed for daily living?: No  Was application for public transport provided?: N/A    DISCHARGE DETAILS:    Discharge planning discussed with[de-identified] patient, mother, daughter, and brother  Freedom of Choice: Yes     CM contacted family/caregiver?: Yes  Were Treatment Team discharge recommendations reviewed with patient/caregiver?: Yes  Did patient/caregiver verbalize understanding of patient care needs?: Yes  Were patient/caregiver advised of the risks associated with not following Treatment Team discharge recommendations?: Yes    Contacts  Patient Contacts: Netta Ford (brother)  Relationship to Patient[de-identified] Family  Contact Method: In Person  Reason/Outcome: Discharge [address]         Is the patient interested in [facility] at discharge?: No    DME Referral Provided  Referral made for DME?: No    Treatment Team Recommendation: Home  Discharge Destination Plan[de-identified] Home  Transport at Discharge : Family    Additional Comments: Met with pt to discuss the role of CM and to discuss any help pt may need prior to dc. Pt lives with her mother in a 2 story home with a ramp to enter. Pt performed ADL's indptly pta, no use of DME. No hx of HHC or rehab. No hx of mental health or D&A treatment. Pt's preferred pharmacy is [facility]. Pt drives. Pt's family will transport home at dc.

## 2023-07-26 NOTE — CASE MANAGEMENT
Case Management Discharge Planning Note    Patient name Chrissy Christianson  [facility] [id number]  : 1960 Date 2023       Current Admission Date: 2023  Current Admission Diagnosis:Acute respiratory failure with hypoxia [facility])   Patient Active Problem List    Diagnosis Date Noted   • Acute respiratory failure with hypoxia ([address]) 2023   • SIRS (systemic inflammatory response syndrome) ([address]) 2023   • Type 2 diabetes mellitus ([address]) 2023   • Atrial fibrillation ([address]) 2023   • Chest pain 2023   • COPD (chronic obstructive pulmonary disease) ([address]) 2023      LOS (days): 0  Geometric Mean LOS (GMLOS) (days):   Days to GMLOS:     OBJECTIVE:            Current admission status: Observation   Preferred Pharmacy:   [id number] - [address] [address]  Phone: [phone] Fax: [phone]    Primary Care Provider: No primary care provider on file. Primary Insurance: MEDICARE  Secondary Insurance:     DISCHARGE DETAILS:        CM informed pt will be transferred to [facility] for a cardiac cath. Medical Necessity completed and placed on chart.

## 2023-07-26 NOTE — ASSESSMENT & PLAN NOTE
· Presented for SOB x 3 days with associated headache, chills, night sweats, and chest pain. · No O2 at baseline, admitted on 4L NC  · CXR: Small lingular opacity may represent pneumonia. · CTA Chest without evidence of PE or other acute pulmonary findings --> "Pulmonary scarring noted left upper lobe especially within the lingular segment.  Mild dependent changes noted at the lung bases."  ·   · COVID/FLU/RSV negative   · Continue to provide supplemental O2

## 2023-07-26 NOTE — ASSESSMENT & PLAN NOTE
Assessment:  · Presented to the ED with leukocytosis, WBC of 16 and tachypneic  · Lactate and Pro-Hermelindo negative  · Chest x-ray showed small lingular opacity which could represent pneumonia  · UA not indicative of of UTI  · Blood cultures checked in the ED x2.  1 bottle positive for gram-positive cocci in clusters-likely a contamination. Repeat blood culture was ordered by slim previously.   · Legionella/strep pneumo negative  · Patient started on IV ceftriaxone    Plan:  · Follow-up culture results  · Repeat CBC with differential in the morning monitor trend WBC  · Continue with IV ceftriaxone  · Repeat Pro-Hermelindo in the a.m. if still negative will likely discontinue antibiotic therapy  · Monitor and trend fever curve

## 2023-07-26 NOTE — NURSING NOTE
Report given to [facility] [place].  Transfer from [facility] to [facility]

## 2023-07-27 LAB
ANION GAP SERPL CALCULATED.3IONS-SCNC: 3 MMOL/L
APTT PPP: 61 SECONDS (ref 23–37)
ATRIAL RATE: 58 BPM
BASOPHILS # BLD AUTO: 0.09 THOUSANDS/ÂΜL (ref 0–0.1)
BASOPHILS NFR BLD AUTO: 0 % (ref 0–1)
BUN SERPL-MCNC: 21 MG/DL (ref 5–25)
CALCIUM SERPL-MCNC: 9.4 MG/DL (ref 8.3–10.1)
CHLORIDE SERPL-SCNC: 101 MMOL/L (ref 96–108)
CO2 SERPL-SCNC: 33 MMOL/L (ref 21–32)
CREAT SERPL-MCNC: 0.83 MG/DL (ref 0.6–1.3)
EOSINOPHIL # BLD AUTO: 0 THOUSAND/ÂΜL (ref 0–0.61)
EOSINOPHIL NFR BLD AUTO: 0 % (ref 0–6)
ERYTHROCYTE [DISTWIDTH] IN BLOOD BY AUTOMATED COUNT: 17.9 % (ref 11.6–15.1)
GFR SERPL CREATININE-BSD FRML MDRD: 93 ML/MIN/1.73SQ M
GLUCOSE SERPL-MCNC: 230 MG/DL (ref 65–140)
GLUCOSE SERPL-MCNC: 246 MG/DL (ref 65–140)
GLUCOSE SERPL-MCNC: 284 MG/DL (ref 65–140)
GLUCOSE SERPL-MCNC: 294 MG/DL (ref 65–140)
HCT VFR BLD AUTO: 49.5 % (ref 36.5–49.3)
HGB BLD-MCNC: 14.8 G/DL (ref 12–17)
IMM GRANULOCYTES # BLD AUTO: 0.36 THOUSAND/UL (ref 0–0.2)
IMM GRANULOCYTES NFR BLD AUTO: 2 % (ref 0–2)
LYMPHOCYTES # BLD AUTO: 1.25 THOUSANDS/ÂΜL (ref 0.6–4.47)
LYMPHOCYTES NFR BLD AUTO: 6 % (ref 14–44)
MAGNESIUM SERPL-MCNC: 2.4 MG/DL (ref 1.6–2.6)
MCH RBC QN AUTO: 23.9 PG (ref 26.8–34.3)
MCHC RBC AUTO-ENTMCNC: 29.9 G/DL (ref 31.4–37.4)
MCV RBC AUTO: 80 FL (ref 82–98)
MONOCYTES # BLD AUTO: 1.14 THOUSAND/ÂΜL (ref 0.17–1.22)
MONOCYTES NFR BLD AUTO: 6 % (ref 4–12)
MRSA NOSE QL CULT: NORMAL
NEUTROPHILS # BLD AUTO: 17.44 THOUSANDS/ÂΜL (ref 1.85–7.62)
NEUTS SEG NFR BLD AUTO: 86 % (ref 43–75)
NRBC BLD AUTO-RTO: 0 /100 WBCS
P AXIS: 72 DEGREES
PLATELET # BLD AUTO: 341 THOUSANDS/UL (ref 149–390)
PMV BLD AUTO: 9.2 FL (ref 8.9–12.7)
POTASSIUM SERPL-SCNC: 4.3 MMOL/L (ref 3.5–5.3)
PR INTERVAL: 144 MS
PROCALCITONIN SERPL-MCNC: <0.05 NG/ML
QRS AXIS: 53 DEGREES
QRSD INTERVAL: 100 MS
QT INTERVAL: 462 MS
QTC INTERVAL: 453 MS
RBC # BLD AUTO: 6.18 MILLION/UL (ref 3.88–5.62)
SODIUM SERPL-SCNC: 137 MMOL/L (ref 135–147)
T WAVE AXIS: 81 DEGREES
VENTRICULAR RATE: 58 BPM
WBC # BLD AUTO: 20.28 THOUSAND/UL (ref 4.31–10.16)

## 2023-07-27 PROCEDURE — C1769 GUIDE WIRE: HCPCS | Performed by: INTERNAL MEDICINE

## 2023-07-27 PROCEDURE — 93005 ELECTROCARDIOGRAM TRACING: CPT

## 2023-07-27 PROCEDURE — 99152 MOD SED SAME PHYS/QHP 5/>YRS: CPT | Performed by: INTERNAL MEDICINE

## 2023-07-27 PROCEDURE — 93455 CORONARY ART/GRFT ANGIO S&I: CPT | Performed by: INTERNAL MEDICINE

## 2023-07-27 PROCEDURE — B2131ZZ FLUOROSCOPY OF MULTIPLE CORONARY ARTERY BYPASS GRAFTS USING LOW OSMOLAR CONTRAST: ICD-10-PCS | Performed by: INTERNAL MEDICINE

## 2023-07-27 PROCEDURE — 94760 N-INVAS EAR/PLS OXIMETRY 1: CPT

## 2023-07-27 PROCEDURE — B2181ZZ FLUOROSCOPY OF LEFT INTERNAL MAMMARY BYPASS GRAFT USING LOW OSMOLAR CONTRAST: ICD-10-PCS | Performed by: INTERNAL MEDICINE

## 2023-07-27 PROCEDURE — B2111ZZ FLUOROSCOPY OF MULTIPLE CORONARY ARTERIES USING LOW OSMOLAR CONTRAST: ICD-10-PCS | Performed by: INTERNAL MEDICINE

## 2023-07-27 PROCEDURE — 93010 ELECTROCARDIOGRAM REPORT: CPT | Performed by: INTERNAL MEDICINE

## 2023-07-27 PROCEDURE — 83735 ASSAY OF MAGNESIUM: CPT | Performed by: PHYSICIAN ASSISTANT

## 2023-07-27 PROCEDURE — 99232 SBSQ HOSP IP/OBS MODERATE 35: CPT | Performed by: INTERNAL MEDICINE

## 2023-07-27 PROCEDURE — 99153 MOD SED SAME PHYS/QHP EA: CPT | Performed by: INTERNAL MEDICINE

## 2023-07-27 PROCEDURE — 82948 REAGENT STRIP/BLOOD GLUCOSE: CPT

## 2023-07-27 PROCEDURE — 85730 THROMBOPLASTIN TIME PARTIAL: CPT | Performed by: PHYSICIAN ASSISTANT

## 2023-07-27 PROCEDURE — 94640 AIRWAY INHALATION TREATMENT: CPT

## 2023-07-27 PROCEDURE — C1760 CLOSURE DEV, VASC: HCPCS | Performed by: INTERNAL MEDICINE

## 2023-07-27 PROCEDURE — 84145 PROCALCITONIN (PCT): CPT | Performed by: PHYSICIAN ASSISTANT

## 2023-07-27 PROCEDURE — 94664 DEMO&/EVAL PT USE INHALER: CPT

## 2023-07-27 PROCEDURE — 80048 BASIC METABOLIC PNL TOTAL CA: CPT | Performed by: PHYSICIAN ASSISTANT

## 2023-07-27 PROCEDURE — 85025 COMPLETE CBC W/AUTO DIFF WBC: CPT | Performed by: PHYSICIAN ASSISTANT

## 2023-07-27 PROCEDURE — 99291 CRITICAL CARE FIRST HOUR: CPT | Performed by: STUDENT IN AN ORGANIZED HEALTH CARE EDUCATION/TRAINING PROGRAM

## 2023-07-27 DEVICE — ANGIO-SEAL VIP VASCULAR CLOSURE DEVICE
Type: IMPLANTABLE DEVICE | Status: FUNCTIONAL
Brand: ANGIO-SEAL

## 2023-07-27 RX ORDER — SODIUM CHLORIDE 9 MG/ML
75 INJECTION, SOLUTION INTRAVENOUS CONTINUOUS
Status: DISPENSED | OUTPATIENT
Start: 2023-07-27 | End: 2023-07-27

## 2023-07-27 RX ORDER — FENTANYL CITRATE 50 UG/ML
INJECTION, SOLUTION INTRAMUSCULAR; INTRAVENOUS CODE/TRAUMA/SEDATION MEDICATION
Status: DISCONTINUED | OUTPATIENT
Start: 2023-07-27 | End: 2023-07-27 | Stop reason: HOSPADM

## 2023-07-27 RX ORDER — LIDOCAINE HYDROCHLORIDE 10 MG/ML
INJECTION, SOLUTION EPIDURAL; INFILTRATION; INTRACAUDAL; PERINEURAL CODE/TRAUMA/SEDATION MEDICATION
Status: DISCONTINUED | OUTPATIENT
Start: 2023-07-27 | End: 2023-07-27 | Stop reason: HOSPADM

## 2023-07-27 RX ORDER — INSULIN LISPRO 100 [IU]/ML
2-12 INJECTION, SOLUTION INTRAVENOUS; SUBCUTANEOUS
Status: DISCONTINUED | OUTPATIENT
Start: 2023-07-27 | End: 2023-07-31 | Stop reason: HOSPADM

## 2023-07-27 RX ORDER — MIDAZOLAM HYDROCHLORIDE 2 MG/2ML
INJECTION, SOLUTION INTRAMUSCULAR; INTRAVENOUS CODE/TRAUMA/SEDATION MEDICATION
Status: DISCONTINUED | OUTPATIENT
Start: 2023-07-27 | End: 2023-07-27 | Stop reason: HOSPADM

## 2023-07-27 RX ORDER — ISOSORBIDE MONONITRATE 60 MG/1
60 TABLET, EXTENDED RELEASE ORAL DAILY
Status: DISCONTINUED | OUTPATIENT
Start: 2023-07-27 | End: 2023-07-31 | Stop reason: HOSPADM

## 2023-07-27 RX ORDER — ISOSORBIDE MONONITRATE 30 MG/1
30 TABLET, EXTENDED RELEASE ORAL DAILY
Status: DISCONTINUED | OUTPATIENT
Start: 2023-07-27 | End: 2023-07-27

## 2023-07-27 RX ADMIN — IPRATROPIUM BROMIDE 0.5 MG: 0.5 SOLUTION RESPIRATORY (INHALATION) at 07:50

## 2023-07-27 RX ADMIN — ATENOLOL 50 MG: 50 TABLET ORAL at 08:25

## 2023-07-27 RX ADMIN — CLOPIDOGREL BISULFATE 75 MG: 75 TABLET ORAL at 08:25

## 2023-07-27 RX ADMIN — FORMOTEROL FUMARATE DIHYDRATE 20 MCG: 20 SOLUTION RESPIRATORY (INHALATION) at 20:10

## 2023-07-27 RX ADMIN — ISOSORBIDE MONONITRATE 60 MG: 60 TABLET, EXTENDED RELEASE ORAL at 17:15

## 2023-07-27 RX ADMIN — SODIUM CHLORIDE 75 ML/HR: 0.9 INJECTION, SOLUTION INTRAVENOUS at 12:33

## 2023-07-27 RX ADMIN — BUDESONIDE 0.5 MG: 0.5 INHALANT RESPIRATORY (INHALATION) at 20:10

## 2023-07-27 RX ADMIN — RANOLAZINE 500 MG: 500 TABLET, FILM COATED, EXTENDED RELEASE ORAL at 17:15

## 2023-07-27 RX ADMIN — ATORVASTATIN CALCIUM 40 MG: 40 TABLET, FILM COATED ORAL at 17:15

## 2023-07-27 RX ADMIN — BUPROPION HYDROCHLORIDE 150 MG: 150 TABLET, FILM COATED, EXTENDED RELEASE ORAL at 08:25

## 2023-07-27 RX ADMIN — ASPIRIN 81 MG CHEWABLE TABLET 81 MG: 81 TABLET CHEWABLE at 08:25

## 2023-07-27 RX ADMIN — RANOLAZINE 500 MG: 500 TABLET, FILM COATED, EXTENDED RELEASE ORAL at 08:25

## 2023-07-27 RX ADMIN — IPRATROPIUM BROMIDE 0.5 MG: 0.5 SOLUTION RESPIRATORY (INHALATION) at 20:10

## 2023-07-27 RX ADMIN — METHYLPREDNISOLONE SODIUM SUCCINATE 40 MG: 40 INJECTION, POWDER, FOR SOLUTION INTRAMUSCULAR; INTRAVENOUS at 05:36

## 2023-07-27 RX ADMIN — GUAIFENESIN 600 MG: 600 TABLET, EXTENDED RELEASE ORAL at 20:44

## 2023-07-27 RX ADMIN — LEVALBUTEROL HYDROCHLORIDE 0.63 MG: 0.63 SOLUTION RESPIRATORY (INHALATION) at 07:49

## 2023-07-27 RX ADMIN — INSULIN LISPRO 4 UNITS: 100 INJECTION, SOLUTION INTRAVENOUS; SUBCUTANEOUS at 08:28

## 2023-07-27 RX ADMIN — APIXABAN 5 MG: 5 TABLET, FILM COATED ORAL at 20:44

## 2023-07-27 RX ADMIN — LEVALBUTEROL HYDROCHLORIDE 0.63 MG: 0.63 SOLUTION RESPIRATORY (INHALATION) at 20:10

## 2023-07-27 RX ADMIN — GUAIFENESIN 600 MG: 600 TABLET, EXTENDED RELEASE ORAL at 08:25

## 2023-07-27 RX ADMIN — INSULIN LISPRO 4 UNITS: 100 INJECTION, SOLUTION INTRAVENOUS; SUBCUTANEOUS at 17:15

## 2023-07-27 NOTE — ASSESSMENT & PLAN NOTE
Patient care in [facility], reports no pulmonologist at home and no prior PFTs. Presented with shortness of breath, started on Azithromycin/Ceftriaxone/solumedrol at [address] [place] [address].      Plan:   · Respiratory protocol  · Scheduled atrovent/xopenex/pulmicort/perforomist  · Prn albuterol  · Wean O2 as able for goal SpO2 > 88%  · Consider d/c antibiotics and steroids today  · AM procalcitonin pending

## 2023-07-27 NOTE — PROGRESS NOTES
Cardiology Team 2 Progress Note Rachel Flores 60 y.o. male MRN: [id number]    Unit/Bed#: [facility] 528-01 Encounter: [id number]      Assessment:  Principal Problem:    NSTEMI (non-ST elevated myocardial infarction) ([address])  Active Problems:    Type 2 diabetes mellitus ([address])    Atrial fibrillation ([address])    COPD (chronic obstructive pulmonary disease) ([address])    Hyperlipidemia    Rachel Flores is a 60 y.o. M w/ PMHx of CAD s/p CABG x 3 in 2014, pAF on eliquis s/p ablation in 2017, COPD, HLD, and DM2 who presented to [facility] with several days of shortness of breath and approximately 1 day of chest pain/tightness and elevated troponin, transferred to [facility] [place] for cardiac catheterization. Plan:  #NSTEMI  • Pt presented to [facility] with several days of SOB, sweating, and headache with troponin elevated up to 902. • Follows cardiologist in [place] but unable to access full records  o Recently had LHC in 11/2021 due to complaints of exertional chest pain for a few months  • Home meds include: Ranexa 500 mg BID, imdur 30 mg daily, atenolol 50 mg daily, crestor 20 mg daily  o Appears to be taking for his hx of chronic exertional chest pain  • Initial EKG findings of new ST depressions in II, V4/V5  • Heparin gtt was initiated and was transferred to [facility] for cardiac catheterization. • 07/26 echo: EF 35%, grade 2 diastolic dysfunction. No RMWA.   • Has risk factors including, hx of CAD s/p CABG x3 (LIMA - LAD, radial - OM, SVG - dRCA), previous hx of cigarette smoking, and uncontrolled T2DM    • Currently chest-pain free  • Was on heparin gtt, now completed for 48 hrs  o Restarted on home eliquis 5 mg daily today  • Will dc nitro gtt, start imdurg 60 mg daily tonight  • ASA/Plavix, s/p plavix load  • Continuing home ranexa, atenolol and started on lipitor 40 mg  • Pending [facility]    #Paroxysmal Afib  • History of afib s/p ablation most recently in 2019  • Home meds include: eliquis 5 mg, atenolol 50 mg  • Appears to be well controlled    • Continuing home meds here  • Tele monitoring without any acute events    #COPD exacerbation  · Initially presented d/t concern for COPD exacerbation, was started on Azithro/Ceftriaxone/solumedrol at [facility]  · May have contributed to pt's anginal sx and subsequent troponin elevation    · Rest of plan per primary    #T2DM  · A1c 8.4%  · On home Xigduo  mg, pt reports he is compliant with meds    · SSI    Subjective:   Patient seen and examined. No significant events overnight. Pt reports he continues to feel sweaty but otherwise does not endorse any other symptoms. Denies lightheadedness, dizziness, syncope, headache, vision changes, chest pain, palpitations, shortness of breath, PND, orthopnea, nausea, vomiting, abdominal pain or lower extremity edema. Historical Information   Past Medical History:   Diagnosis Date   • COPD (chronic obstructive pulmonary disease) ([address])    • Diabetes mellitus ([address])    • High cholesterol    • Hypertension      Past Surgical History:   Procedure Laterality Date   • CHOLECYSTECTOMY     • SHOULDER SURGERY       Social History     Substance and Sexual Activity   Alcohol Use Never     Social History     Substance and Sexual Activity   Drug Use Never     Social History     Tobacco Use   Smoking Status Every Day   • Packs/day: 0.25   • Types: Cigarettes   Smokeless Tobacco Never     Family History: No family history on file. Vitals: Blood pressure 145/78, pulse 55, temperature 97.7 °F (36.5 °C), temperature source Oral, resp. rate 17, height 5' 9" (1.753 m), weight 86.5 kg (190 lb 9.6 oz), SpO2 98 %. , Body mass index is 28.15 kg/m².,   Orthostatic Blood Pressures    Flowsheet Row Most Recent Value   Blood Pressure 145/78 filed at 07/27/2023 0709   Patient Position - Orthostatic VS Lying filed at 07/27/2023 0709            Intake/Output Summary (Last 24 hours) at 7/27/2023 0814  Last data filed at 7/27/2023 0751  Gross per 24 hour   Intake 914.4 ml   Output 1050 ml   Net -135.6 ml         Physical Exam  Vitals reviewed. Constitutional:       Appearance: Normal appearance. He is not ill-appearing. HENT:      Head: Normocephalic and atraumatic. Nose: Nose normal. No congestion. Mouth/Throat:      Mouth: Mucous membranes are moist.      Pharynx: Oropharynx is clear. No oropharyngeal exudate. Eyes:      Conjunctiva/sclera: Conjunctivae normal.      Pupils: Pupils are equal, round, and reactive to light. Neck:      Vascular: No carotid bruit. Cardiovascular:      Rate and Rhythm: Normal rate and regular rhythm. Pulses: Normal pulses. Heart sounds: Normal heart sounds. No murmur heard. No friction rub. No gallop. Pulmonary:      Effort: Pulmonary effort is normal. No respiratory distress. Breath sounds: Examination of the right-upper field reveals wheezing. Examination of the left-upper field reveals wheezing. Examination of the right-middle field reveals wheezing. Examination of the left-middle field reveals wheezing. Examination of the right-lower field reveals decreased breath sounds and wheezing. Examination of the left-lower field reveals decreased breath sounds and wheezing. Decreased breath sounds and wheezing (expiratory) present. Abdominal:      General: Abdomen is flat. Bowel sounds are normal. There is no distension. Palpations: Abdomen is soft. Tenderness: There is no abdominal tenderness. Musculoskeletal:         General: Normal range of motion. Cervical back: Normal range of motion. Right lower leg: No edema. Left lower leg: No edema. Skin:     General: Skin is warm and dry. Capillary Refill: Capillary refill takes less than 2 seconds. Neurological:      General: No focal deficit present. Mental Status: He is alert and oriented to person, place, and time. Mental status is at baseline. Sensory: No sensory deficit. Motor: No weakness.    Psychiatric:         Mood and Affect: Mood normal.         Behavior: Behavior normal.         Thought Content:  Thought content normal.       Invasive Devices     Peripheral Intravenous Line  Duration           Peripheral IV 07/25/23 Right Antecubital 1 day    Peripheral IV 07/26/23 Right;Ventral (anterior) Forearm <1 day                  Meds/Allergies   Hospital Medications:   Current Facility-Administered Medications   Medication Dose Route Frequency   • acetaminophen (TYLENOL) tablet 650 mg  650 mg Oral Q6H PRN   • aspirin chewable tablet 81 mg  81 mg Oral Daily   • atenolol (TENORMIN) tablet 50 mg  50 mg Oral Daily   • atorvastatin (LIPITOR) tablet 40 mg  40 mg Oral Daily With Dinner   • azithromycin (ZITHROMAX) 500 mg in sodium chloride 0.9 % 250 mL IVPB  500 mg Intravenous Q24H   • budesonide (PULMICORT) inhalation solution 0.5 mg  0.5 mg Nebulization Q12H   • buPROPion (WELLBUTRIN XL) 24 hr tablet 150 mg  150 mg Oral Daily   • calcium carbonate (TUMS) chewable tablet 500 mg  500 mg Oral TID PRN   • cefTRIAXone (ROCEPHIN) 1,000 mg in dextrose 5 % 50 mL IVPB  1,000 mg Intravenous Q24H   • clopidogrel (PLAVIX) tablet 75 mg  75 mg Oral Daily   • diphenhydrAMINE (BENADRYL) injection 25 mg  25 mg Intravenous Q6H PRN   • formoterol (PERFOROMIST) nebulizer solution 20 mcg  20 mcg Nebulization Q12H   • guaiFENesin (MUCINEX) 12 hr tablet 600 mg  600 mg Oral Q12H REYNOLD   • heparin (porcine) 25,000 units in 0.45% NaCl 250 mL infusion (premix)  3-20 Units/kg/hr (Order-Specific) Intravenous Titrated   • heparin (porcine) injection 2,000 Units  2,000 Units Intravenous Q6H PRN   • heparin (porcine) injection 4,000 Units  4,000 Units Intravenous Q6H PRN   • insulin lispro (HumaLOG) 100 units/mL subcutaneous injection 2-12 Units  2-12 Units Subcutaneous TID AC   • ipratropium (ATROVENT) 0.02 % inhalation solution 0.5 mg  0.5 mg Nebulization TID   • levalbuterol (XOPENEX) inhalation solution 0.63 mg  0.63 mg Nebulization TID   • methylPREDNISolone sodium succinate (Solu-MEDROL) injection 40 mg  40 mg Intravenous Q8H [facility]   • nitroglycerin (NITROSTAT) SL tablet 0.4 mg  0.4 mg Sublingual Q5 Min PRN   • nitroGLYcerin (TRIDIL) 50 mg in 250 mL infusion (premix)  5-200 mcg/min Intravenous Titrated   • ranolazine (RANEXA) 12 hr tablet 500 mg  500 mg Oral BID     Home Medications:   Medications Prior to Admission   Medication   • apixaban (Eliquis) 5 mg   • atenolol (TENORMIN) 50 mg tablet   • buPROPion (ZYBAN) 150 MG 12 hr tablet   • Dapagliflozin-metFORMIN HCl ER (Xigduo XR)  MG TB24   • Fluticasone Furoate-Vilanterol (BREO ELLIPTA IN)   • isosorbide mononitrate (IMDUR) 30 mg 24 hr tablet   • ranolazine (RANEXA) 500 mg 12 hr tablet   • rosuvastatin (CRESTOR) 20 MG tablet   • ALBUTEROL IN       Allergies   Allergen Reactions   • Oxycodone Abdominal Pain     Pt does not respond well to opioids/narcotics   • Penicillins Other (See Comments)     Unknown       Objective   Lab Results: I have personally reviewed pertinent lab results. Results from last 7 days   Lab Units 07/27/23  0552 07/26/23 0428 07/25/23  0956   POTASSIUM mmol/L 4.3 4.9 4.3   CO2 mmol/L 33* 29 26   CHLORIDE mmol/L 101 100 97   BUN mg/dL 21 23 16   CREATININE mg/dL 0.83 0.77 0.71     Results from last 7 days   Lab Units 07/27/23  0552 07/26/23  0428 07/25/23  0956   HEMOGLOBIN g/dL 14.8 13.9 14.7   HEMATOCRIT % 49.5* 46.0 47.8   PLATELETS Thousands/uL 341 294 293     Results from last 7 days   Lab Units 07/27/23  0552 07/26/23  2314 07/26/23  1702   PTT seconds 61* 39* 45*     Results from last 7 days   Lab Units 07/26/23 0428   HDL mg/dL 19*   LDL CALC mg/dL 49   TRIGLYCERIDES mg/dL 117         Imaging: I have personally reviewed pertinent reports. No results found. Previous STRESS TEST:  No results found for this or any previous visit. No results found for this or any previous visit. No results found for this or any previous visit.       Previous Cath/PCI:  No results found for this or any previous visit. ECHO:  No results found for this or any previous visit. No results found for this or any previous visit. HOLTER  No results found for this or any previous visit. EKG:   Date: 07/27/2023  Heart rate: 58  Rhythm: NSR  Cardiac Axis: NAD  OH Interval: Normal  QRS Complex: Narrow complex  ST Segment: Normal  QT Interval: Normal  Overall Impression: Sinus bradycardia, NAD, no ischemic changes noted, otherwise normal EKG. VTE Prophylaxis: Eliquis    Case discussed and reviewed with Dr. Karine Holden DO who agrees with my assessment and plan. Thank you for involving us in the care of your patient. Keshia Nava MD PGY-1  [facility]    ** Please Note: Fluency DirectDictation voice to text software may have been used in the creation of this document.  **

## 2023-07-27 NOTE — ASSESSMENT & PLAN NOTE
Presented to [facility] w/ chest pain, shortness of breath. Troponin peaked at 889. Heparin gtt initiated, cardiology consulted, transferred to [facility] for cardiac catheterization. 7/26 Echo: EF 67%, Grade 2 diastolic dysfunction.  No regional wall motion abnormalities    Plan:  · Cardiology consult appreciated  · Heparin gtt  · ASA/Plavix  · NPO for possible cath today  · Nitro gtt at 5 for chest pain with prn SL nitro  · Continue home ranexa

## 2023-07-27 NOTE — DISCHARGE INSTR - AVS FIRST PAGE
1. Please see the post cardiac catheterization dishcarge instructions. No heavy lifting, greater than 10 lbs. or strenuous  activity for 48 hrs. 2.Remove band aid tomorrow. Shower and wash area- groin gently with soap and water- beginning tomorrow. Rinse and pat dry. Apply new water seal band aid. Repeat this process for 5 days. No powders, creams lotions or antibiotic ointments  for 5 days. No tub baths, hot tubs or swimming for 5 days. 3. Please call our office ([phone]) if you have any fever, redness, swelling, discharge from your groin access site. 4.No driving for 1 day    5.  Angioseal Booklet     Arrange for a PCP visit in [place]-info link information provided

## 2023-07-27 NOTE — PROGRESS NOTES
[facility] [facility]  Progress Note  Name: Jay Ramos  MRN: [id number]  Unit/Bed#: [facility] 769-00 I Date of Admission: 7/26/2023   Date of Service: 7/27/2023 I Hospital Day: 1    Assessment/Plan   * NSTEMI (non-ST elevated myocardial infarction) [facility])  Assessment & Plan  Presented to [facility] w/ chest pain, shortness of breath. Troponin peaked at 889. Heparin gtt initiated, cardiology consulted, transferred to [facility] for cardiac catheterization. 7/26 Echo: EF 30%, Grade 2 diastolic dysfunction. No regional wall motion abnormalities    Plan:  · Cardiology consult appreciated  · Heparin gtt  · ASA/Plavix  · NPO for possible cath today  · Nitro gtt at 5 for chest pain with prn SL nitro  · Continue home ranexa    Atrial fibrillation [facility])  Assessment & Plan  History of AFib s/p ablation in [facility]) on Eliquis    Plan:  · Telemetry monitoring  · Hold eliquis, continue heparin gtt  · Home atenolol     COPD (chronic obstructive pulmonary disease) [facility])  Assessment & Plan  Patient care in [facility], reports no pulmonologist at home and no prior PFTs. Presented with shortness of breath, started on Azithromycin/Ceftriaxone/solumedrol at [address] [place] [address]. Plan:   · Respiratory protocol  · Scheduled atrovent/xopenex/pulmicort/perforomist  · Prn albuterol  · Wean O2 as able for goal SpO2 > 88%  · Consider d/c antibiotics, AM procalcitonin pending  · Solumedrol 40mg Q8 - consider d/c    Type 2 diabetes mellitus [facility])  Assessment & Plan  Lab Results   Component Value Date    HGBA1C 8.4 (H) 07/25/2023       Recent Labs     07/26/23  0716 07/26/23  1122 07/26/23  1609 07/26/23  2310   POCGLU 191* 288* 240* 326*       Blood Sugar Average: Last 72 hrs:     Hold home Dapagliflozin-metformin  SSI    Hyperlipidemia  Assessment & Plan  Continue statin           ICU Core Measures     A: Assess, Prevent, and Manage Pain · Has pain been assessed? Yes  · Need for changes to pain regimen?  No   B: Both SAT/SAT  · N/A   C: Choice of Sedation · RASS Goal: N/A patient not on sedation  · Need for changes to sedation or analgesia regimen? No   D: Delirium · CAM-ICU: Negative   E: Early Mobility  · Plan for early mobility? Yes   F: Family Engagement · Plan for family engagement today? Yes       Antibiotic Review: Antibiotics to be discontinued today      Prophylaxis:  VTE VTE covered by:  heparin (porcine), Intravenous, 24 Units/kg/hr at 07/27/23 0000  heparin (porcine), Intravenous  heparin (porcine), Intravenous, 4,000 Units at 07/26/23 2357       Stress Ulcer  not ordered       Subjective   HPI/24hr events: Remains on nitro gtt at 5. On 2L NC. No acute events. Objective                            Vitals I/O      Most Recent Min/Max in 24hrs   Temp 98.2 °F (36.8 °C) Temp  Min: 97.4 °F (36.3 °C)  Max: 98.7 °F (37.1 °C)   Pulse 58 Pulse  Min: 57  Max: 72   Resp 16 Resp  Min: 16  Max: 34   /84 BP  Min: 124/68  Max: 159/70   O2 Sat 96 % SpO2  Min: 89 %  Max: 96 %      Intake/Output Summary (Last 24 hours) at 7/27/2023 0518  Last data filed at 7/27/2023 0243  Gross per 24 hour   Intake 852.35 ml   Output 1050 ml   Net -197.65 ml         Diet NPO     Invasive Monitoring Physical exam    Physical Exam  Vitals and nursing note reviewed. Constitutional:       General: He is not in acute distress. Interventions: Nasal cannula in place. HENT:      Head: Normocephalic and atraumatic. Eyes:      Pupils: Pupils are equal, round, and reactive to light. Cardiovascular:      Rate and Rhythm: Normal rate and regular rhythm. Pulses:           Dorsalis pedis pulses are 1+ on the right side and 1+ on the left side. Heart sounds: Heart sounds not distant. No murmur heard. No friction rub. No gallop. Comments: Feet cold to touch  Pulmonary:      Effort: Pulmonary effort is normal.      Breath sounds: Normal breath sounds. No decreased breath sounds, wheezing, rhonchi or rales.    Abdominal: General: There is no distension. Palpations: Abdomen is soft. Tenderness: There is no abdominal tenderness. Skin:     General: Skin is warm and dry. Neurological:      General: No focal deficit present. Mental Status: He is alert and oriented to person, place, and time. GCS: GCS eye subscore is 4. GCS verbal subscore is 5. GCS motor subscore is 6. Psychiatric:         Behavior: Behavior is cooperative. Diagnostic Studies        Imaging: No new imaging I have personally reviewed pertinent reports. Medications:  Scheduled PRN   aspirin, 81 mg, Daily  atenolol, 50 mg, Daily  atorvastatin, 40 mg, Daily With Dinner  azithromycin, 500 mg, Q24H  budesonide, 0.5 mg, Q12H  buPROPion, 150 mg, Daily  cefTRIAXone, 1,000 mg, Q24H  clopidogrel, 75 mg, Daily  formoterol, 20 mcg, Q12H  guaiFENesin, 600 mg, Q12H REYNOLD  insulin lispro, 2-12 Units, TID AC  ipratropium, 0.5 mg, TID  levalbuterol, 0.63 mg, TID  methylPREDNISolone sodium succinate, 40 mg, Q8H REYNOLD  ranolazine, 500 mg, BID      acetaminophen, 650 mg, Q6H PRN  calcium carbonate, 500 mg, TID PRN  diphenhydrAMINE, 25 mg, Q6H PRN  heparin (porcine), 2,000 Units, Q6H PRN  heparin (porcine), 4,000 Units, Q6H PRN  nitroglycerin, 0.4 mg, Q5 Min PRN       Continuous    heparin (porcine), 3-20 Units/kg/hr (Order-Specific), Last Rate: 24 Units/kg/hr (07/27/23 0000)  nitroGLYcerin, 5-200 mcg/min, Last Rate: 5 mcg/min (07/26/23 2322)         Labs:    CBC    Recent Labs     07/25/23 0956 07/26/23  0428   WBC 16.15* 15.01*   HGB 14.7 13.9   HCT 47.8 46.0    294     BMP    Recent Labs     07/25/23  0956 07/26/23  0428   SODIUM 133* 134*   K 4.3 4.9   CL 97 100   CO2 26 29   AGAP 10 5   BUN 16 23   CREATININE 0.71 0.77   CALCIUM 8.9 8.6       Coags    Recent Labs     07/25/23  0956 07/26/23  0428 07/26/23  1702 07/26/23  2314   INR 1.07  --   --   --    PTT 29   < > 45* 39*    < > = values in this interval not displayed.         Additional Electrolytes  Recent Labs     07/26/23  0428 07/26/23  1804   MG 2.4 2.1          Blood Gas    No recent results  No recent results LFTs  Recent Labs     07/25/23  0956 07/26/23  0428   ALT 9 10   AST 9* 12*   ALKPHOS 75 64   ALB 3.9 3.5   TBILI 0.63 0.29       Infectious  Recent Labs     07/25/23  1036 07/26/23  0428   PROCALCITONI 0.07 0.06     Glucose  Recent Labs     07/25/23  0956 07/26/23  0428   GLUC 128 194*                 [address] [place], [place]

## 2023-07-27 NOTE — ASSESSMENT & PLAN NOTE
Lab Results   Component Value Date    HGBA1C 8.4 (H) 07/25/2023       Recent Labs     07/26/23  0716 07/26/23  1122 07/26/23  1609 07/26/23  2310   POCGLU 191* 288* 240* 326*       Blood Sugar Average: Last 72 hrs:     Hold home Dapagliflozin-metformin  SSI

## 2023-07-27 NOTE — ASSESSMENT & PLAN NOTE
Patient care in [facility], reports no pulmonologist at home and no prior PFTs. Presented with shortness of breath, started on Azithromycin/Ceftriaxone/solumedrol at [address] [place] [address].      Plan:   · Respiratory protocol  · Scheduled atrovent/xopenex/pulmicort/perforomist  · Prn albuterol  · Wean O2 as able for goal SpO2 > 88%  · Consider d/c antibiotics, AM procalcitonin pending  · Solumedrol 40mg Q8 - consider d/c

## 2023-07-27 NOTE — ASSESSMENT & PLAN NOTE
Presented to [facility] w/ chest pain, shortness of breath. Troponin peaked at 889. Heparin gtt initiated, cardiology consulted, transferred to [facility] for cardiac catheterization. 7/26 Echo: EF 84%, Grade 2 diastolic dysfunction.  No regional wall motion abnormalities    Plan:  · Cardiology consult appreciated  · Heparin gtt  · ASA/Plavix  · NPO for possible cath today  · Nitro gtt at 5 for chest pain with prn SL nitro  · Continue home ranexa

## 2023-07-27 NOTE — CASE MANAGEMENT
Case Management Progress Note    Patient name Tayler Elizondo  Location [address] [place] [address] [address]/[facility] [id number] MRN [id number]  : 1960 Date 2023       LOS (days): 1  Geometric Mean LOS (GMLOS) (days): 2.40  Days to GMLOS:1.7        Pt was transferred to [facility] on 23 from [facility] where pt was originally admitted on 23. For assessment information, please refer to the Case Management Admission Assessment note documented by Hector Tidwell on 23 at 3:28PM.    CM to follow.

## 2023-07-27 NOTE — ASSESSMENT & PLAN NOTE
History of AFib s/p ablation in 2017 [facility]) on Eliquis    Plan:  · Telemetry monitoring  · Hold eliquis, continue heparin gtt  · Home atenolol

## 2023-07-27 NOTE — ASSESSMENT & PLAN NOTE
History of AFib s/p ablation in 2017 [facility]) on Eliquis    Plan:  · Telemetry monitoring  · Hold eliquis, continue heparin gtt  · Home atenolol

## 2023-07-27 NOTE — H&P
[address] [facility]  H&P  Name: Jessi Castillo 60 y.o. male I MRN: [id number]  Unit/Bed#: PPHP 528-01 I Date of Admission: 7/26/2023   Date of Service: 7/27/2023 I Hospital Day: 1      Assessment/Plan   * NSTEMI (non-ST elevated myocardial infarction) [facility])  Assessment & Plan  Presented to [facility] w/ chest pain, shortness of breath. Troponin peaked at 889. Heparin gtt initiated, cardiology consulted, transferred to [facility] for cardiac catheterization. 7/26 Echo: EF 35%, Grade 2 diastolic dysfunction. No regional wall motion abnormalities    Plan:  · Cardiology consult appreciated  · Heparin gtt  · ASA/Plavix  · NPO for possible cath today  · Nitro gtt at 5 for chest pain with prn SL nitro  · Continue home ranexa    Atrial fibrillation [facility])  Assessment & Plan  History of AFib s/p ablation in 2017 [facility]) on Eliquis    Plan:  · Telemetry monitoring  · Hold eliquis, continue heparin gtt  · Home atenolol     COPD (chronic obstructive pulmonary disease) [facility])  Assessment & Plan  Patient care in [facility], reports no pulmonologist at home and no prior PFTs. Presented with shortness of breath, started on Azithromycin/Ceftriaxone/solumedrol at [address] [place] [address].      Plan:   · Respiratory protocol  · Scheduled atrovent/xopenex/pulmicort/perforomist  · Prn albuterol  · Wean O2 as able for goal SpO2 > 88%  · Consider d/c antibiotics and steroids today  · AM procalcitonin pending    Type 2 diabetes mellitus [facility])  Assessment & Plan  Lab Results   Component Value Date    HGBA1C 8.4 (H) 07/25/2023       Recent Labs     07/26/23  0716 07/26/23  1122 07/26/23  1609 07/26/23  2310   POCGLU 191* 288* 240* 326*       Blood Sugar Average: Last 72 hrs:     Hold home Dapagliflozin-metformin  SSI    Hyperlipidemia  Assessment & Plan  Continue statin         History of Present Illness     HPI: Jessi Castillo is a 60 y.o. M w/ PMHx of CAD s/p CABG x 3 in 2014, pAF on eliquis s/p ablation in 2017, COPD, HLD, and DM2 who presented with several days of shortness of breath and approximately 1 day of chest pain/tightness. Patient lives in [facility] and is in the area visiting. He states that he has been experiencing some worsening SOB that he attributed to the heat, but yesterday morning started to have new onset of chest pain. He ultimately went to [facility] for evaluation where he received ASA and a duoneb. Troponins were noted to be mildly elevated, peaked at 889. Cardiology evaluated patient and recommended transfer to [facility] for cardiac catheterization. He was started on a nitro drip prior to transfer with relief of his chest pain. History obtained from chart review and the patient. Review of Systems   Respiratory: Positive for shortness of breath.            Historical Information   Past Medical History:  No date: COPD (chronic obstructive pulmonary disease) ([facility])  No date: Diabetes mellitus ([facility])  No date: High cholesterol  No date: Hypertension Past Surgical History:  No date: CHOLECYSTECTOMY  No date: SHOULDER SURGERY   Current Outpatient Medications   Medication Instructions   • ALBUTEROL IN Inhale   • apixaban (ELIQUIS) 5 mg, Oral, 2 times daily   • atenolol (TENORMIN) 50 mg, Oral, Daily   • buPROPion (ZYBAN) 150 mg, Oral, Daily   • Dapagliflozin-metFORMIN HCl ER (Xigduo XR)  MG TB24 Oral   • Fluticasone Furoate-Vilanterol (BREO ELLIPTA IN) Inhalation   • isosorbide mononitrate (IMDUR) 30 mg, Oral, Daily   • ranolazine (RANEXA) 500 mg, Oral, 2 times daily   • rosuvastatin (CRESTOR) 20 mg, Oral, Daily    Allergies   Allergen Reactions   • Oxycodone Abdominal Pain     Pt does not respond well to opioids/narcotics   • Penicillins Other (See Comments)     Unknown      Social History     Tobacco Use   • Smoking status: Every Day     Packs/day: 0.25     Types: Cigarettes   • Smokeless tobacco: Never   Vaping Use   • Vaping Use: Never used   Substance Use Topics   • Alcohol use: Never   • Drug use: Never    No family history on file. Objective                            Vitals I/O      Most Recent Min/Max in 24hrs   Temp 98.4 °F (36.9 °C) Temp  Min: 97.4 °F (36.3 °C)  Max: 98.7 °F (37.1 °C)   Pulse 62 Pulse  Min: 57  Max: 72   Resp 18 Resp  Min: 16  Max: 34   /69 BP  Min: 124/68  Max: 159/70   O2 Sat 93 % SpO2  Min: 89 %  Max: 93 %      Intake/Output Summary (Last 24 hours) at 2023 0125  Last data filed at 2023 1435  Gross per 24 hour   Intake 780 ml   Output --   Net 780 ml         Diet NPO     Invasive Monitoring Physical exam    Physical Exam  Vitals and nursing note reviewed. Constitutional:       General: He is not in acute distress. Appearance: He is not ill-appearing or toxic-appearing. Interventions: Nasal cannula in place. HENT:      Head: Normocephalic and atraumatic. Eyes:      Pupils: Pupils are equal, round, and reactive to light. Cardiovascular:      Rate and Rhythm: Normal rate and regular rhythm. Heart sounds: Normal heart sounds. Heart sounds not distant. No murmur heard. No friction rub. No gallop. Pulmonary:      Effort: No tachypnea. Breath sounds: No decreased breath sounds, wheezing, rhonchi or rales. Comments: Mild conversational dyspnea  Abdominal:      General: There is no distension. Palpations: Abdomen is soft. Tenderness: There is no abdominal tenderness. Musculoskeletal:      Right lower leg: No edema. Left lower leg: No edema. Skin:     General: Skin is warm and dry. Neurological:      General: No focal deficit present. Mental Status: He is alert and oriented to person, place, and time. Psychiatric:         Behavior: Behavior is cooperative. Diagnostic Studies        Imagin/26 Echo: •  Left Ventricle: Left ventricular cavity size is normal. Wall thickness is mildly increased. The left ventricular ejection fraction is 65% by visual estimation. . Systolic function is normal. Although no diagnostic regional wall motion abnormality was identified, this possibility cannot be completely excluded on the basis of this study. Diastolic function is moderately abnormal, consistent with grade II (pseudonormal) relaxation. •  Right Ventricle: Right ventricular cavity size is mildly dilated. Systolic function is normal.  •  Aortic Valve: The aortic valve cannot be ruled out as bicuspid. There is mild stenosis. •  Tricuspid Valve: There is mild regurgitation. The right ventricular systolic pressure is mildly elevated. The estimated right ventricular systolic pressure is 02.44 mmHg. I have personally reviewed pertinent reports.        Medications:  Scheduled PRN   aspirin, 81 mg, Daily  atenolol, 50 mg, Daily  atorvastatin, 40 mg, Daily With Dinner  azithromycin, 500 mg, Q24H  budesonide, 0.5 mg, Q12H  buPROPion, 150 mg, Daily  cefTRIAXone, 1,000 mg, Q24H  clopidogrel, 75 mg, Daily  formoterol, 20 mcg, Q12H  guaiFENesin, 600 mg, Q12H REYNOLD  insulin lispro, 2-12 Units, TID AC  ipratropium, 0.5 mg, TID  levalbuterol, 0.63 mg, TID  methylPREDNISolone sodium succinate, 40 mg, Q8H REYNOLD  ranolazine, 500 mg, BID      acetaminophen, 650 mg, Q6H PRN  calcium carbonate, 500 mg, TID PRN  diphenhydrAMINE, 25 mg, Q6H PRN  heparin (porcine), 2,000 Units, Q6H PRN  heparin (porcine), 4,000 Units, Q6H PRN  nitroglycerin, 0.4 mg, Q5 Min PRN       Continuous    heparin (porcine), 3-20 Units/kg/hr (Order-Specific), Last Rate: 24 Units/kg/hr (07/27/23 0000)  nitroGLYcerin, 5-200 mcg/min, Last Rate: 5 mcg/min (07/26/23 2322)         Labs:    CBC    Recent Labs     07/25/23  0956 07/26/23  0428   WBC 16.15* 15.01*   HGB 14.7 13.9   HCT 47.8 46.0    294     BMP    Recent Labs     07/25/23  0956 07/26/23  0428   SODIUM 133* 134*   K 4.3 4.9   CL 97 100   CO2 26 29   AGAP 10 5   BUN 16 23   CREATININE 0.71 0.77   CALCIUM 8.9 8.6       Coags    Recent Labs     07/25/23  0956 07/26/23  0428 07/26/23  1702 07/26/23  2314   INR 1.07 --   --   --    PTT 29   < > 45* 39*    < > = values in this interval not displayed.         Additional Electrolytes  Recent Labs     07/26/23  0428 07/26/23  1804   MG 2.4 2.1          Blood Gas    No recent results  No recent results LFTs  Recent Labs     07/25/23  0956 07/26/23  0428   ALT 9 10   AST 9* 12*   ALKPHOS 75 64   ALB 3.9 3.5   TBILI 0.63 0.29       Infectious  Recent Labs     07/25/23  1036 07/26/23  0428   PROCALCITONI 0.07 0.06     Glucose  Recent Labs     07/25/23  0956 07/26/23  0428   GLUC 128 194*               Anticipated Length of Stay is > 2 midnights  Carmen Helm PA-C

## 2023-07-28 ENCOUNTER — APPOINTMENT (OUTPATIENT)
Dept: RADIOLOGY | Facility: HOSPITAL | Age: 63
DRG: 280 | End: 2023-07-28
Payer: MEDICARE

## 2023-07-28 LAB
ANION GAP SERPL CALCULATED.3IONS-SCNC: 5 MMOL/L
BNP SERPL-MCNC: 205 PG/ML (ref 0–100)
BUN SERPL-MCNC: 18 MG/DL (ref 5–25)
CALCIUM SERPL-MCNC: 8.8 MG/DL (ref 8.3–10.1)
CHLORIDE SERPL-SCNC: 98 MMOL/L (ref 96–108)
CO2 SERPL-SCNC: 32 MMOL/L (ref 21–32)
CREAT SERPL-MCNC: 0.73 MG/DL (ref 0.6–1.3)
GFR SERPL CREATININE-BSD FRML MDRD: 98 ML/MIN/1.73SQ M
GLUCOSE SERPL-MCNC: 196 MG/DL (ref 65–140)
GLUCOSE SERPL-MCNC: 226 MG/DL (ref 65–140)
GLUCOSE SERPL-MCNC: 227 MG/DL (ref 65–140)
GLUCOSE SERPL-MCNC: 243 MG/DL (ref 65–140)
GLUCOSE SERPL-MCNC: 276 MG/DL (ref 65–140)
MAGNESIUM SERPL-MCNC: 2.2 MG/DL (ref 1.6–2.6)
POTASSIUM SERPL-SCNC: 4.1 MMOL/L (ref 3.5–5.3)
SODIUM SERPL-SCNC: 135 MMOL/L (ref 135–147)

## 2023-07-28 PROCEDURE — 83735 ASSAY OF MAGNESIUM: CPT | Performed by: STUDENT IN AN ORGANIZED HEALTH CARE EDUCATION/TRAINING PROGRAM

## 2023-07-28 PROCEDURE — 82948 REAGENT STRIP/BLOOD GLUCOSE: CPT

## 2023-07-28 PROCEDURE — 83880 ASSAY OF NATRIURETIC PEPTIDE: CPT | Performed by: STUDENT IN AN ORGANIZED HEALTH CARE EDUCATION/TRAINING PROGRAM

## 2023-07-28 PROCEDURE — 71045 X-RAY EXAM CHEST 1 VIEW: CPT

## 2023-07-28 PROCEDURE — 94760 N-INVAS EAR/PLS OXIMETRY 1: CPT

## 2023-07-28 PROCEDURE — 99232 SBSQ HOSP IP/OBS MODERATE 35: CPT | Performed by: FAMILY MEDICINE

## 2023-07-28 PROCEDURE — 80048 BASIC METABOLIC PNL TOTAL CA: CPT | Performed by: STUDENT IN AN ORGANIZED HEALTH CARE EDUCATION/TRAINING PROGRAM

## 2023-07-28 PROCEDURE — 94664 DEMO&/EVAL PT USE INHALER: CPT

## 2023-07-28 PROCEDURE — 94640 AIRWAY INHALATION TREATMENT: CPT

## 2023-07-28 PROCEDURE — 99232 SBSQ HOSP IP/OBS MODERATE 35: CPT | Performed by: INTERNAL MEDICINE

## 2023-07-28 RX ORDER — INSULIN GLARGINE 100 [IU]/ML
8 INJECTION, SOLUTION SUBCUTANEOUS
Status: DISCONTINUED | OUTPATIENT
Start: 2023-07-28 | End: 2023-07-30

## 2023-07-28 RX ADMIN — GUAIFENESIN 600 MG: 600 TABLET, EXTENDED RELEASE ORAL at 08:09

## 2023-07-28 RX ADMIN — IPRATROPIUM BROMIDE 0.5 MG: 0.5 SOLUTION RESPIRATORY (INHALATION) at 19:30

## 2023-07-28 RX ADMIN — LEVALBUTEROL HYDROCHLORIDE 0.63 MG: 0.63 SOLUTION RESPIRATORY (INHALATION) at 13:17

## 2023-07-28 RX ADMIN — INSULIN LISPRO 2 UNITS: 100 INJECTION, SOLUTION INTRAVENOUS; SUBCUTANEOUS at 17:12

## 2023-07-28 RX ADMIN — LEVALBUTEROL HYDROCHLORIDE 0.63 MG: 0.63 SOLUTION RESPIRATORY (INHALATION) at 07:28

## 2023-07-28 RX ADMIN — ISOSORBIDE MONONITRATE 60 MG: 60 TABLET, EXTENDED RELEASE ORAL at 08:10

## 2023-07-28 RX ADMIN — BUPROPION HYDROCHLORIDE 150 MG: 150 TABLET, FILM COATED, EXTENDED RELEASE ORAL at 08:10

## 2023-07-28 RX ADMIN — ATORVASTATIN CALCIUM 40 MG: 40 TABLET, FILM COATED ORAL at 17:15

## 2023-07-28 RX ADMIN — RANOLAZINE 500 MG: 500 TABLET, FILM COATED, EXTENDED RELEASE ORAL at 08:10

## 2023-07-28 RX ADMIN — LEVALBUTEROL HYDROCHLORIDE 0.63 MG: 0.63 SOLUTION RESPIRATORY (INHALATION) at 19:30

## 2023-07-28 RX ADMIN — BUDESONIDE 0.5 MG: 0.5 INHALANT RESPIRATORY (INHALATION) at 19:30

## 2023-07-28 RX ADMIN — INSULIN GLARGINE 8 UNITS: 100 INJECTION, SOLUTION SUBCUTANEOUS at 20:38

## 2023-07-28 RX ADMIN — APIXABAN 5 MG: 5 TABLET, FILM COATED ORAL at 08:10

## 2023-07-28 RX ADMIN — IPRATROPIUM BROMIDE 0.5 MG: 0.5 SOLUTION RESPIRATORY (INHALATION) at 13:17

## 2023-07-28 RX ADMIN — BUDESONIDE 0.5 MG: 0.5 INHALANT RESPIRATORY (INHALATION) at 07:28

## 2023-07-28 RX ADMIN — GUAIFENESIN 600 MG: 600 TABLET, EXTENDED RELEASE ORAL at 20:38

## 2023-07-28 RX ADMIN — RANOLAZINE 500 MG: 500 TABLET, FILM COATED, EXTENDED RELEASE ORAL at 17:15

## 2023-07-28 RX ADMIN — FORMOTEROL FUMARATE DIHYDRATE 20 MCG: 20 SOLUTION RESPIRATORY (INHALATION) at 07:28

## 2023-07-28 RX ADMIN — IPRATROPIUM BROMIDE 0.5 MG: 0.5 SOLUTION RESPIRATORY (INHALATION) at 07:28

## 2023-07-28 RX ADMIN — APIXABAN 5 MG: 5 TABLET, FILM COATED ORAL at 17:15

## 2023-07-28 RX ADMIN — INSULIN LISPRO 4 UNITS: 100 INJECTION, SOLUTION INTRAVENOUS; SUBCUTANEOUS at 08:10

## 2023-07-28 RX ADMIN — INSULIN LISPRO 6 UNITS: 100 INJECTION, SOLUTION INTRAVENOUS; SUBCUTANEOUS at 11:48

## 2023-07-28 RX ADMIN — ASPIRIN 81 MG CHEWABLE TABLET 81 MG: 81 TABLET CHEWABLE at 08:10

## 2023-07-28 RX ADMIN — ATENOLOL 50 MG: 50 TABLET ORAL at 08:10

## 2023-07-28 NOTE — PROGRESS NOTES
[address] [facility]  Progress Note  Name: Yomi Gibson  MRN: [id number]  Unit/Bed#: PPHP 618-98 I Date of Admission: 7/26/2023   Date of Service: 7/28/2023 I Hospital Day: 2    Assessment/Plan   * NSTEMI (non-ST elevated myocardial infarction) [facility])  Assessment & Plan  · Patient with known history of coronary artery disease status post CABG. Presented to the hospital with shortness of breath. Started on heparin drip and transferred to [facility] for cardiac catheterization  · Patient with NSTEMI. Status just postcardiac catheterization showing diffuse disease  · Medical management  · Previous history of CABG  · Patient with shortness of breath with activities today. Continue with the current medical management and monitor    Hyperlipidemia  Assessment & Plan  · Continue with statin    COPD (chronic obstructive pulmonary disease) ([facility])  Assessment & Plan  · No acute exacerbation    Atrial fibrillation ([address])  Assessment & Plan  · Patient with a history of atrial fibrillation. Status post ablation in 2019  · Continue with Eliquis and atenolol  · Cardiology on board    Type 2 diabetes mellitus [facility])  Assessment & Plan  Lab Results   Component Value Date    HGBA1C 8.4 (H) 07/25/2023       Recent Labs     07/27/23  2003 07/28/23  0640 07/28/23  1146 07/28/23  1641   POCGLU 294* 226* 276* 196*       Blood Sugar Average: Last 72 hrs:  (P) [id number]   Blood sugar elevated. Adjust the dose of insulin           VTE Pharmacologic Prophylaxis:   Moderate Risk (Score 3-4) - Pharmacological DVT Prophylaxis Ordered: apixaban (Eliquis). Patient Centered Rounds: I performed bedside rounds with nursing staff today. Discussions with Specialists or Other Care Team Provider:     Education and Discussions with Family / Patient: Patient.      Total Time Spent on Date of Encounter in care of patient: 35 minutes This time was spent on one or more of the following: performing physical exam; counseling and coordination of care; obtaining or reviewing history; documenting in the medical record; reviewing/ordering tests, medications or procedures; communicating with other healthcare professionals and discussing with patient's family/caregivers. Current Length of Stay: 2 day(s)  Current Patient Status: Inpatient   Certification Statement: The patient will continue to require additional inpatient hospital stay due to Medical management of CAD  Discharge Plan: Anticipate discharge tomorrow to home. Code Status: Level 1 - Full Code    Subjective:   Patient seen and examined. Cardiology evaluation appreciated. Patient noted to have shortness of breath with activities today. Patient was also feeling lightheaded and was unsteady on his feet. Monitor overnight in the hospital and reevaluate tomorrow    Objective:     Vitals:   Temp (24hrs), Av.3 °F (36.8 °C), Min:98 °F (36.7 °C), Max:98.8 °F (37.1 °C)    Temp:  [98 °F (36.7 °C)-98.8 °F (37.1 °C)] 98.3 °F (36.8 °C)  HR:  [53-59] 59  Resp:  [16-28] 17  BP: (112-144)/(62-76) 133/76  SpO2:  [90 %-97 %] 94 %  Body mass index is 28.38 kg/m². Input and Output Summary (last 24 hours): Intake/Output Summary (Last 24 hours) at 2023 1845  Last data filed at 2023 1741  Gross per 24 hour   Intake 720 ml   Output 1100 ml   Net -380 ml       Physical Exam:   Physical Exam  Constitutional:       General: He is not in acute distress. HENT:      Head: Normocephalic. Nose: Nose normal.   Eyes:      General: No scleral icterus. Cardiovascular:      Rate and Rhythm: Normal rate and regular rhythm. Pulses: Normal pulses. Pulmonary:      Comments: Decreased breath sounds bilateral  Abdominal:      General: Abdomen is flat. There is no distension. Musculoskeletal:         General: Normal range of motion. Skin:     General: Skin is warm. Neurological:      Mental Status: He is alert. Cranial Nerves: No cranial nerve deficit. Additional Data:     Labs:  Results from last 7 days   Lab Units 07/27/23  0552   WBC Thousand/uL 20.28*   HEMOGLOBIN g/dL 14.8   HEMATOCRIT % 49.5*   PLATELETS Thousands/uL 341   NEUTROS PCT % 86*   LYMPHS PCT % 6*   MONOS PCT % 6   EOS PCT % 0     Results from last 7 days   Lab Units 07/28/23  1207 07/27/23  0552 07/26/23  0428   SODIUM mmol/L 135   < > 134*   POTASSIUM mmol/L 4.1   < > 4.9   CHLORIDE mmol/L 98   < > 100   CO2 mmol/L 32   < > 29   BUN mg/dL 18   < > 23   CREATININE mg/dL 0.73   < > 0.77   ANION GAP mmol/L 5   < > 5   CALCIUM mg/dL 8.8   < > 8.6   ALBUMIN g/dL  --   --  3.5   TOTAL BILIRUBIN mg/dL  --   --  0.29   ALK PHOS U/L  --   --  64   ALT U/L  --   --  10   AST U/L  --   --  12*   GLUCOSE RANDOM mg/dL 243*   < > 194*    < > = values in this interval not displayed.      Results from last 7 days   Lab Units 07/25/23  0956   INR  1.07     Results from last 7 days   Lab Units 07/28/23  1641 07/28/23  1146 07/28/23  0640 07/27/23  2003 07/27/23  1610 07/27/23  0558 07/26/23  2310 07/26/23  1609 07/26/23  1122 07/26/23  0716 07/25/23  2108 07/25/23  1635   POC GLUCOSE mg/dl 196* 276* 226* 294* 284* 246* 326* 240* 288* 191* 246* 136     Results from last 7 days   Lab Units 07/25/23  0956   HEMOGLOBIN A1C % 8.4*     Results from last 7 days   Lab Units 07/27/23  0552 07/26/23  0428 07/25/23  1036   LACTIC ACID mmol/L  --   --  0.8   PROCALCITONIN ng/ml <0.05 0.06 0.07       Lines/Drains:  Invasive Devices     Peripheral Intravenous Line  Duration           Peripheral IV 07/25/23 Right Antecubital 3 days    Peripheral IV 07/26/23 Right;Ventral (anterior) Forearm 2 days                  Telemetry:  Telemetry Orders (From admission, onward)             24 Hour Telemetry Monitoring  Continuous x 24 Hours (Telem)        Question:  Reason for 24 Hour Telemetry  Answer:  PCI/EP study (including pacer and ICD implementation), Cardiac surgery, MI, abnormal cardiac cath, and chest pain- rule out MI Telemetry Reviewed: Normal Sinus Rhythm  Indication for Continued Telemetry Use: Acute MI/Unstable Angina/Rule out ACS             Imaging: Reviewed radiology reports from this admission including: chest CT scan    Recent Cultures (last 7 days):   Results from last 7 days   Lab Units 07/26/23  1702 07/26/23  1701 07/25/23  1717 07/25/23  1036   BLOOD CULTURE  No Growth at 24 hrs. No Growth at 24 hrs.  --  No Growth at 72 hrs.   Gram Positive Cocci*   GRAM STAIN RESULT   --   --   --  Gram positive cocci in clusters*   LEGIONELLA URINARY ANTIGEN   --   --  Negative  --        Last 24 Hours Medication List:   Current Facility-Administered Medications   Medication Dose Route Frequency Provider Last Rate   • acetaminophen  650 mg Oral Q6H PRN Anatoly Candelaria PA-C     • apixaban  5 mg Oral BID BREANNA Cassidy     • aspirin  81 mg Oral Daily Anatoly Candelaria PA-C     • atenolol  50 mg Oral Daily Anatoly Candelaira PA-C     • atorvastatin  40 mg Oral Daily With [employer], PA-C     • budesonide  0.5 mg Nebulization Q12H Anatoly Candelaria PA-C     • buPROPion  150 mg Oral Daily Anatoly Candelaria PA-C     • calcium carbonate  500 mg Oral TID PRN Anatoly Candelaria PA-C     • diphenhydrAMINE  25 mg Intravenous Q6H PRN Anatoly Candelaria PA-C     • formoterol  20 mcg Nebulization Q12H Anatoly Candelaria PA-C     • guaiFENesin  600 mg Oral Q12H [facility] Anatoly Candelaria PA-C     • insulin glargine  8 Units Subcutaneous HS Luana Chan MD     • insulin lispro  2-12 Units Subcutaneous TID [facility] Anatoly Candelaria PA-C     • ipratropium  0.5 mg Nebulization TID Anatoly Candelaria PA-C     • isosorbide mononitrate  60 mg Oral Daily Sandra Mejía MD     • levalbuterol  0.63 mg Nebulization TID Anatoly Candelaria PA-C     • nitroglycerin  0.4 mg Sublingual Q5 Min PRN Anatoly Candelaria PA-C     • ranolazine  500 mg Oral BID Denice Don Montserrat Maldonado PA-C          Today, Patient Was Seen By: Jas Hanley MD    **Please Note: This note may have been constructed using a voice recognition system. **

## 2023-07-28 NOTE — ASSESSMENT & PLAN NOTE
· Patient with a history of atrial fibrillation.   Status post ablation in 2019  · Continue with Eliquis and atenolol  · Cardiology on board

## 2023-07-28 NOTE — PROGRESS NOTES
Cardiology Team 2 Progress Note Michelle Shaw 60 y.o. male MRN: [id number]    Unit/Bed#: [facility] 528-01 Encounter: [id number]      Assessment:  Principal Problem:    NSTEMI (non-ST elevated myocardial infarction) ([address])  Active Problems:    Type 2 diabetes mellitus ([address])    Atrial fibrillation ([address])    COPD (chronic obstructive pulmonary disease) ([address])    Hyperlipidemia    Kaley Craft a 60 y.o. M w/ PMHx of CAD s/p CABG x 3 in 2014, pAF on eliquis s/p ablation in 2017, COPD, HLD, and DM2 who presented to [facility] with several days of shortness of breath and approximately 1 day of chest pain/tightness and elevated troponin s/p [facility] with findings of RCA graft occlusion from previous CABG.    Plan:  #NSTEMI  • Pt presented to [facility] with several days of SOB, sweating, and headache with troponin elevated up to 902. • Follows cardiologist in [place] but unable to access full records  ? Recently had LHC in 11/2021 due to complaints of exertional chest pain for a few months  • Home meds include: Ranexa 500 mg BID, imdur 30 mg daily, atenolol 50 mg daily, crestor 20 mg daily  ? Appears to be taking for his hx of chronic exertional chest pain  • Initial EKG findings of new ST depressions in II, V4/V5  • Heparin gtt was initiated and was transferred to [facility] for cardiac catheterization. • 07/26 echo: EF 93%, grade 2 diastolic dysfunction. No RMWA. • Has risk factors including, hx of CAD s/p CABG x3 (LIMA - LAD, radial - OM, SVG - dRCA), previous hx of cigarette smoking, and uncontrolled T2DM     • Currently chest-pain free  • Was on heparin gtt, now completed for 48 hrs  ?  Restarted on home eliquis 5 mg daily today  • Will dc nitro gtt, start imdurg 60 mg daily  • ASA/Plavix, s/p plavix load  • Continuing home ranexa, atenolol and started on lipitor 40 mg  • Will require f/u outpatient with [facility] cardiology     #Paroxysmal Afib  • History of afib s/p ablation most recently in 2019  • Home meds include: eliquis 5 mg, atenolol 50 mg  • Appears to be well controlled     • Continuing home meds here  • Tele monitoring without any acute events     #COPD exacerbation  • Initially presented d/t concern for COPD exacerbation, was started on Azithro/Ceftriaxone/solumedrol at [facility]  • May have contributed to pt's anginal sx and subsequent troponin elevation  • Ambulatory pulse ox was 84%, may require home O2 eval     • Rest of plan per primary     #T2DM  • A1c 8.4%  • On home Xigduo  mg, pt reports he is compliant with meds  • Will require stricter control of BG at discharge     • SSI    Subjective:   Patient seen and examined. No significant events overnight. Denies lightheadedness, dizziness, syncope, headache, vision changes, diaphoresis, chest pain, palpitations, shortness of breath, PND, orthopnea, nausea, vomiting, abdominal pain or lower extremity edema. Historical Information   Past Medical History:   Diagnosis Date   • COPD (chronic obstructive pulmonary disease) ([address])    • Diabetes mellitus ([address])    • High cholesterol    • Hypertension      Past Surgical History:   Procedure Laterality Date   • CARDIAC CATHETERIZATION N/A 7/27/2023    Procedure: Cardiac catheterization;  Surgeon: Leesa Colon MD;  Location: [facility]; Service: Cardiology   • CHOLECYSTECTOMY     • SHOULDER SURGERY       Social History     Substance and Sexual Activity   Alcohol Use Never     Social History     Substance and Sexual Activity   Drug Use Never     Social History     Tobacco Use   Smoking Status Every Day   • Packs/day: 0.25   • Types: Cigarettes   Smokeless Tobacco Never     Family History: No family history on file. Vitals: Blood pressure 144/62, pulse (!) 53, temperature 98.8 °F (37.1 °C), temperature source Oral, resp. rate 20, height 5' 9" (1.753 m), weight 87.2 kg (192 lb 3.2 oz), SpO2 95 %. , Body mass index is 28.38 kg/m².,   Orthostatic Blood Pressures    Flowsheet Row Most Recent Value   Blood Pressure 144/62 filed at 07/28/2023 0732   Patient Position - Orthostatic VS Sitting filed at 07/28/2023 0732            Intake/Output Summary (Last 24 hours) at 7/28/2023 0852  Last data filed at 7/28/2023 0732  Gross per 24 hour   Intake 545.75 ml   Output 2450 ml   Net -1904.25 ml         Physical Exam  Vitals reviewed. Constitutional:       Appearance: Normal appearance. He is not ill-appearing. HENT:      Head: Normocephalic and atraumatic. Nose: Nose normal. No congestion. Mouth/Throat:      Mouth: Mucous membranes are moist.      Pharynx: Oropharynx is clear. No oropharyngeal exudate. Eyes:      Conjunctiva/sclera: Conjunctivae normal.      Pupils: Pupils are equal, round, and reactive to light. Neck:      Vascular: No carotid bruit. Cardiovascular:      Rate and Rhythm: Normal rate and regular rhythm. Pulses: Normal pulses. Heart sounds: Normal heart sounds. No murmur heard. No friction rub. No gallop. Pulmonary:      Effort: Pulmonary effort is normal. No respiratory distress. Breath sounds: Wheezes: end-expiratory at lower lung bases. Abdominal:      General: Abdomen is flat. Bowel sounds are normal. There is no distension. Palpations: Abdomen is soft. Tenderness: There is no abdominal tenderness. Musculoskeletal:         General: Normal range of motion. Cervical back: Normal range of motion. Right lower leg: No edema. Left lower leg: No edema. Skin:     General: Skin is warm and dry. Capillary Refill: Capillary refill takes less than 2 seconds. Neurological:      General: No focal deficit present. Mental Status: He is alert and oriented to person, place, and time. Mental status is at baseline. Sensory: No sensory deficit. Motor: No weakness. Psychiatric:         Mood and Affect: Mood normal.         Behavior: Behavior normal.         Thought Content:  Thought content normal.       Invasive Devices     Peripheral Intravenous Line  Duration           Peripheral IV 07/25/23 Right Antecubital 2 days    Peripheral IV 07/26/23 Right;Ventral (anterior) Forearm 1 day                  Meds/Allergies   Hospital Medications:   Current Facility-Administered Medications   Medication Dose Route Frequency   • acetaminophen (TYLENOL) tablet 650 mg  650 mg Oral Q6H PRN   • apixaban (ELIQUIS) tablet 5 mg  5 mg Oral BID   • aspirin chewable tablet 81 mg  81 mg Oral Daily   • atenolol (TENORMIN) tablet 50 mg  50 mg Oral Daily   • atorvastatin (LIPITOR) tablet 40 mg  40 mg Oral Daily With Dinner   • budesonide (PULMICORT) inhalation solution 0.5 mg  0.5 mg Nebulization Q12H   • buPROPion (WELLBUTRIN XL) 24 hr tablet 150 mg  150 mg Oral Daily   • calcium carbonate (TUMS) chewable tablet 500 mg  500 mg Oral TID PRN   • diphenhydrAMINE (BENADRYL) injection 25 mg  25 mg Intravenous Q6H PRN   • formoterol (PERFOROMIST) nebulizer solution 20 mcg  20 mcg Nebulization Q12H   • guaiFENesin (MUCINEX) 12 hr tablet 600 mg  600 mg Oral Q12H REYNOLD   • insulin lispro (HumaLOG) 100 units/mL subcutaneous injection 2-12 Units  2-12 Units Subcutaneous TID AC   • ipratropium (ATROVENT) 0.02 % inhalation solution 0.5 mg  0.5 mg Nebulization TID   • isosorbide mononitrate (IMDUR) 24 hr tablet 60 mg  60 mg Oral Daily   • levalbuterol (XOPENEX) inhalation solution 0.63 mg  0.63 mg Nebulization TID   • nitroglycerin (NITROSTAT) SL tablet 0.4 mg  0.4 mg Sublingual Q5 Min PRN   • ranolazine (RANEXA) 12 hr tablet 500 mg  500 mg Oral BID     Home Medications:   Medications Prior to Admission   Medication   • apixaban (Eliquis) 5 mg   • atenolol (TENORMIN) 50 mg tablet   • buPROPion (ZYBAN) 150 MG 12 hr tablet   • Dapagliflozin-metFORMIN HCl ER (Xigduo XR)  MG TB24   • Fluticasone Furoate-Vilanterol (BREO ELLIPTA IN)   • isosorbide mononitrate (IMDUR) 30 mg 24 hr tablet   • ranolazine (RANEXA) 500 mg 12 hr tablet   • rosuvastatin (CRESTOR) 20 MG tablet   • ALBUTEROL IN Allergies   Allergen Reactions   • Oxycodone Abdominal Pain     Pt does not respond well to opioids/narcotics   • Penicillins Other (See Comments)     Unknown       Objective   Lab Results: I have personally reviewed pertinent lab results. Results from last 7 days   Lab Units 07/27/23  0552 07/26/23  0428 07/25/23  0956   POTASSIUM mmol/L 4.3 4.9 4.3   CO2 mmol/L 33* 29 26   CHLORIDE mmol/L 101 100 97   BUN mg/dL 21 23 16   CREATININE mg/dL 0.83 0.77 0.71     Results from last 7 days   Lab Units 07/27/23  0552 07/26/23  0428 07/25/23  0956   HEMOGLOBIN g/dL 14.8 13.9 14.7   HEMATOCRIT % 49.5* 46.0 47.8   PLATELETS Thousands/uL 341 294 293     Results from last 7 days   Lab Units 07/27/23  0552 07/26/23  2314 07/26/23  1702   PTT seconds 61* 39* 45*     Results from last 7 days   Lab Units 07/26/23  0428   HDL mg/dL 19*   LDL CALC mg/dL 49   TRIGLYCERIDES mg/dL 117         Imaging: I have personally reviewed pertinent reports. No results found. Previous STRESS TEST:  No results found for this or any previous visit. No results found for this or any previous visit. No results found for this or any previous visit. Previous Cath/PCI:  No results found for this or any previous visit. ECHO:  No results found for this or any previous visit. No results found for this or any previous visit. HOLTER  No results found for this or any previous visit. EKG:   No new EKGs to review    VTE Prophylaxis: Eliquis    Case discussed and reviewed with Dr. Alesia Golden DO who agrees with my assessment and plan. Thank you for involving us in the care of your patient. Wynona Scheuermann, MD PGY-1  [facility]    ** Please Note: Fluency DirectDictation voice to text software may have been used in the creation of this document.  **

## 2023-07-28 NOTE — ASSESSMENT & PLAN NOTE
Lab Results   Component Value Date    HGBA1C 8.4 (H) 07/25/2023       Recent Labs     07/27/23  2003 07/28/23  0640 07/28/23  1146 07/28/23  1641   POCGLU 294* 226* 276* 196*       Blood Sugar Average: Last 72 hrs:  (P) [id number]   Blood sugar elevated.   Adjust the dose of insulin

## 2023-07-28 NOTE — ASSESSMENT & PLAN NOTE
· Patient with known history of coronary artery disease status post CABG. Presented to the hospital with shortness of breath. Started on heparin drip and transferred to [address] [place] [address] for cardiac catheterization  · Patient with NSTEMI. Status just postcardiac catheterization showing diffuse disease  · Medical management  · Previous history of CABG  · Patient with shortness of breath with activities today.   Continue with the current medical management and monitor

## 2023-07-29 ENCOUNTER — APPOINTMENT (INPATIENT)
Dept: RADIOLOGY | Facility: HOSPITAL | Age: 63
DRG: 280 | End: 2023-07-29
Payer: MEDICARE

## 2023-07-29 PROBLEM — R09.02 HYPOXEMIA: Status: ACTIVE | Noted: 2023-07-29

## 2023-07-29 LAB
ALBUMIN SERPL BCP-MCNC: 2.8 G/DL (ref 3.5–5)
ALL TARGETS: NOT DETECTED
ALP SERPL-CCNC: 71 U/L (ref 46–116)
ALT SERPL W P-5'-P-CCNC: 21 U/L (ref 12–78)
ANION GAP SERPL CALCULATED.3IONS-SCNC: 3 MMOL/L
AST SERPL W P-5'-P-CCNC: 11 U/L (ref 5–45)
BACTERIA BLD CULT: ABNORMAL
BILIRUB SERPL-MCNC: 0.34 MG/DL (ref 0.2–1)
BUN SERPL-MCNC: 13 MG/DL (ref 5–25)
CALCIUM ALBUM COR SERPL-MCNC: 9.6 MG/DL (ref 8.3–10.1)
CALCIUM SERPL-MCNC: 8.6 MG/DL (ref 8.3–10.1)
CHLORIDE SERPL-SCNC: 100 MMOL/L (ref 96–108)
CO2 SERPL-SCNC: 33 MMOL/L (ref 21–32)
CREAT SERPL-MCNC: 0.73 MG/DL (ref 0.6–1.3)
ERYTHROCYTE [DISTWIDTH] IN BLOOD BY AUTOMATED COUNT: 17.2 % (ref 11.6–15.1)
GFR SERPL CREATININE-BSD FRML MDRD: 98 ML/MIN/1.73SQ M
GLUCOSE SERPL-MCNC: 152 MG/DL (ref 65–140)
GLUCOSE SERPL-MCNC: 212 MG/DL (ref 65–140)
GLUCOSE SERPL-MCNC: 227 MG/DL (ref 65–140)
GLUCOSE SERPL-MCNC: 268 MG/DL (ref 65–140)
GLUCOSE SERPL-MCNC: 283 MG/DL (ref 65–140)
GRAM STN SPEC: ABNORMAL
HCT VFR BLD AUTO: 47.1 % (ref 36.5–49.3)
HGB BLD-MCNC: 14.6 G/DL (ref 12–17)
MCH RBC QN AUTO: 24.8 PG (ref 26.8–34.3)
MCHC RBC AUTO-ENTMCNC: 31 G/DL (ref 31.4–37.4)
MCV RBC AUTO: 80 FL (ref 82–98)
PLATELET # BLD AUTO: 271 THOUSANDS/UL (ref 149–390)
PMV BLD AUTO: 9.4 FL (ref 8.9–12.7)
POTASSIUM SERPL-SCNC: 4.1 MMOL/L (ref 3.5–5.3)
PROT SERPL-MCNC: 6.3 G/DL (ref 6.4–8.4)
RBC # BLD AUTO: 5.89 MILLION/UL (ref 3.88–5.62)
SODIUM SERPL-SCNC: 136 MMOL/L (ref 135–147)
WBC # BLD AUTO: 12.57 THOUSAND/UL (ref 4.31–10.16)

## 2023-07-29 PROCEDURE — G1004 CDSM NDSC: HCPCS

## 2023-07-29 PROCEDURE — 82948 REAGENT STRIP/BLOOD GLUCOSE: CPT

## 2023-07-29 PROCEDURE — 99232 SBSQ HOSP IP/OBS MODERATE 35: CPT | Performed by: INTERNAL MEDICINE

## 2023-07-29 PROCEDURE — 71275 CT ANGIOGRAPHY CHEST: CPT

## 2023-07-29 PROCEDURE — 94664 DEMO&/EVAL PT USE INHALER: CPT

## 2023-07-29 PROCEDURE — 94640 AIRWAY INHALATION TREATMENT: CPT

## 2023-07-29 PROCEDURE — 99232 SBSQ HOSP IP/OBS MODERATE 35: CPT | Performed by: FAMILY MEDICINE

## 2023-07-29 PROCEDURE — 94760 N-INVAS EAR/PLS OXIMETRY 1: CPT

## 2023-07-29 PROCEDURE — 80053 COMPREHEN METABOLIC PANEL: CPT | Performed by: FAMILY MEDICINE

## 2023-07-29 PROCEDURE — 85027 COMPLETE CBC AUTOMATED: CPT | Performed by: FAMILY MEDICINE

## 2023-07-29 RX ORDER — LEVALBUTEROL INHALATION SOLUTION 1.25 MG/3ML
1.25 SOLUTION RESPIRATORY (INHALATION)
Status: DISCONTINUED | OUTPATIENT
Start: 2023-07-29 | End: 2023-07-31 | Stop reason: HOSPADM

## 2023-07-29 RX ORDER — LIDOCAINE 50 MG/G
1 PATCH TOPICAL DAILY
Status: DISCONTINUED | OUTPATIENT
Start: 2023-07-30 | End: 2023-07-31 | Stop reason: HOSPADM

## 2023-07-29 RX ORDER — TRAMADOL HYDROCHLORIDE 50 MG/1
50 TABLET ORAL EVERY 6 HOURS PRN
Status: DISCONTINUED | OUTPATIENT
Start: 2023-07-29 | End: 2023-07-31 | Stop reason: HOSPADM

## 2023-07-29 RX ADMIN — GUAIFENESIN 600 MG: 600 TABLET, EXTENDED RELEASE ORAL at 21:05

## 2023-07-29 RX ADMIN — INSULIN LISPRO 6 UNITS: 100 INJECTION, SOLUTION INTRAVENOUS; SUBCUTANEOUS at 11:47

## 2023-07-29 RX ADMIN — IPRATROPIUM BROMIDE 0.5 MG: 0.5 SOLUTION RESPIRATORY (INHALATION) at 19:31

## 2023-07-29 RX ADMIN — APIXABAN 5 MG: 5 TABLET, FILM COATED ORAL at 08:54

## 2023-07-29 RX ADMIN — ISOSORBIDE MONONITRATE 60 MG: 60 TABLET, EXTENDED RELEASE ORAL at 08:54

## 2023-07-29 RX ADMIN — GUAIFENESIN 600 MG: 600 TABLET, EXTENDED RELEASE ORAL at 08:54

## 2023-07-29 RX ADMIN — BUPROPION HYDROCHLORIDE 150 MG: 150 TABLET, FILM COATED, EXTENDED RELEASE ORAL at 08:54

## 2023-07-29 RX ADMIN — INSULIN GLARGINE 8 UNITS: 100 INJECTION, SOLUTION SUBCUTANEOUS at 21:06

## 2023-07-29 RX ADMIN — LEVALBUTEROL HYDROCHLORIDE 0.63 MG: 0.63 SOLUTION RESPIRATORY (INHALATION) at 07:18

## 2023-07-29 RX ADMIN — IOHEXOL 85 ML: 350 INJECTION, SOLUTION INTRAVENOUS at 12:59

## 2023-07-29 RX ADMIN — BUDESONIDE 0.5 MG: 0.5 INHALANT RESPIRATORY (INHALATION) at 07:18

## 2023-07-29 RX ADMIN — ATORVASTATIN CALCIUM 40 MG: 40 TABLET, FILM COATED ORAL at 17:02

## 2023-07-29 RX ADMIN — INSULIN LISPRO 4 UNITS: 100 INJECTION, SOLUTION INTRAVENOUS; SUBCUTANEOUS at 07:56

## 2023-07-29 RX ADMIN — RANOLAZINE 500 MG: 500 TABLET, FILM COATED, EXTENDED RELEASE ORAL at 17:02

## 2023-07-29 RX ADMIN — FORMOTEROL FUMARATE DIHYDRATE 20 MCG: 20 SOLUTION RESPIRATORY (INHALATION) at 07:18

## 2023-07-29 RX ADMIN — IPRATROPIUM BROMIDE 0.5 MG: 0.5 SOLUTION RESPIRATORY (INHALATION) at 07:18

## 2023-07-29 RX ADMIN — INSULIN LISPRO 2 UNITS: 100 INJECTION, SOLUTION INTRAVENOUS; SUBCUTANEOUS at 17:02

## 2023-07-29 RX ADMIN — RANOLAZINE 500 MG: 500 TABLET, FILM COATED, EXTENDED RELEASE ORAL at 08:54

## 2023-07-29 RX ADMIN — LEVALBUTEROL HYDROCHLORIDE 1.25 MG: 1.25 SOLUTION RESPIRATORY (INHALATION) at 19:31

## 2023-07-29 RX ADMIN — APIXABAN 5 MG: 5 TABLET, FILM COATED ORAL at 17:02

## 2023-07-29 RX ADMIN — ATENOLOL 50 MG: 50 TABLET ORAL at 08:54

## 2023-07-29 RX ADMIN — ASPIRIN 81 MG CHEWABLE TABLET 81 MG: 81 TABLET CHEWABLE at 08:54

## 2023-07-29 RX ADMIN — BUDESONIDE 0.5 MG: 0.5 INHALANT RESPIRATORY (INHALATION) at 19:31

## 2023-07-29 NOTE — PROGRESS NOTES
Cardiology Progress Note Hernan Townsend 60 y.o. male MRN: [id number]    Unit/Bed#: [facility] 528-01 Encounter: [id number]  Assessment and plan  #1 non-STEMI type II  #2 coronary artery disease with history of CABG occluded graft to the right, patent graft to LIMA and OM  #3 hyperlipidemia  #4 paroxysmal atrial fibrillation  #5 COPD  #6 diabetes mellitus type 2  #7 acute hypoxemic respiratory failure    Recommendations: From a cardiac standpoint he has no angina on medical therapy has been doing well, last 24 hours he developed hypoxemia he is now on 5 L. Chest x-ray did not show any significant pulmonary edema. Check stat CT chest for pulmonary embolism this morning. He is on chronic Eliquis. Continue  Imdur and Ranexa for chronic anginal symptoms due to the occluded graft to the right there is a small collateral network from the distal marginal    Subjective:    No significant events overnight. Overall he has been feeling well however has developed hypoxemia in the last 24 hours. Denies any chest pain or shortness of breath. Denies any palpitations or lower leg pain or swelling. ROS    Objective:   Vitals: Blood pressure 127/66, pulse 62, temperature 97.9 °F (36.6 °C), temperature source Oral, resp. rate 16, height 5' 9" (1.753 m), weight 84.9 kg (187 lb 3.2 oz), SpO2 94 %. , Body mass index is 27.64 kg/m².,   Orthostatic Blood Pressures    Flowsheet Row Most Recent Value   Blood Pressure 127/66 filed at 07/29/2023 0720   Patient Position - Orthostatic VS Lying filed at 07/29/2023 2066         Systolic (65JXY), WYJ:752 , Min:112 , TCQ:409     Diastolic (00VYB), NXL:49, Min:60, Max:76      Intake/Output Summary (Last 24 hours) at 7/29/2023 0954  Last data filed at 7/29/2023 0854  Gross per 24 hour   Intake 720 ml   Output 1600 ml   Net -880 ml     Weight (last 2 days)     Date/Time Weight    07/29/23 0600 84.9 (187.2)    07/28/23 0348 87.2 (192.2)    07/27/23 0538 86.5 (190.6)            Telemetry Review: No significant arrhythmias seen on telemetry review. EKG personally reviewed by Tiesha Haskins DO. Physical Exam  Vitals and nursing note reviewed. Constitutional:       General: He is not in acute distress. Appearance: He is well-developed. HENT:      Head: Normocephalic and atraumatic. Eyes:      Conjunctiva/sclera: Conjunctivae normal.      Pupils: Pupils are equal, round, and reactive to light. Cardiovascular:      Rate and Rhythm: Normal rate and regular rhythm. Heart sounds: Normal heart sounds. No murmur heard. No friction rub. Pulmonary:      Effort: Pulmonary effort is normal. No respiratory distress. Breath sounds: Normal breath sounds. No wheezing or rales. Abdominal:      General: Bowel sounds are normal. There is no distension. Palpations: Abdomen is soft. Tenderness: There is no abdominal tenderness. There is no rebound. Musculoskeletal:         General: No tenderness or deformity. Normal range of motion. Cervical back: Neck supple. Skin:     General: Skin is warm and dry. Findings: No erythema. Neurological:      Mental Status: He is alert and oriented to person, place, and time. Cranial Nerves: No cranial nerve deficit.            Laboratory Results:        CBC with diff:   Results from last 7 days   Lab Units 07/29/23  0610 07/27/23  0552 07/26/23  0428 07/25/23  0956   WBC Thousand/uL 12.57* 20.28* 15.01* 16.15*   HEMOGLOBIN g/dL 14.6 14.8 13.9 14.7   HEMATOCRIT % 47.1 49.5* 46.0 47.8   MCV fL 80* 80* 80* 80*   PLATELETS Thousands/uL 271 341 294 293   RBC Million/uL 5.89* 6.18* 5.76* 6.00*   MCH pg 24.8* 23.9* 24.1* 24.5*   MCHC g/dL 31.0* 29.9* 30.2* 30.8*   RDW % 17.2* 17.9* 17.2* 17.7*   MPV fL 9.4 9.2 9.1 9.4   NRBC AUTO /100 WBCs  --  0 0 0         CMP:  Results from last 7 days   Lab Units 07/29/23  0610 07/28/23  1207 07/27/23  0552 07/26/23  0428 07/25/23  0956   POTASSIUM mmol/L 4.1 4.1 4.3 4.9 4.3   CHLORIDE mmol/L 100 98 101 100 97   CO2 mmol/L 33* 32 33* 29 26   BUN mg/dL 13 18 21 23 16   CREATININE mg/dL 0.73 0.73 0.83 0.77 0.71   CALCIUM mg/dL 8.6 8.8 9.4 8.6 8.9   AST U/L 11  --   --  12* 9*   ALT U/L 21  --   --  10 9   ALK PHOS U/L 71  --   --  64 75   EGFR ml/min/1.73sq m 98 98 93 96 99         BMP:  Results from last 7 days   Lab Units 07/29/23  0610 07/28/23  1207 07/27/23  0552 07/26/23  0428 07/25/23  0956   POTASSIUM mmol/L 4.1 4.1 4.3 4.9 4.3   CHLORIDE mmol/L 100 98 101 100 97   CO2 mmol/L 33* 32 33* 29 26   BUN mg/dL 13 18 21 23 16   CREATININE mg/dL 0.73 0.73 0.83 0.77 0.71   CALCIUM mg/dL 8.6 8.8 9.4 8.6 8.9       BNP:   Recent Labs     07/28/23  1442   *       Magnesium:   Results from last 7 days   Lab Units 07/28/23  1207 07/27/23  0552 07/26/23  1804 07/26/23  0428   MAGNESIUM mg/dL 2.2 2.4 2.1 2.4       Coags:   Results from last 7 days   Lab Units 07/27/23  0552 07/26/23  2314 07/26/23  1702 07/26/23  1113 07/26/23  0428 07/25/23  0956   PTT seconds 61* 39* 45* 34 36 29   INR   --   --   --   --   --  1.07       TSH:        Hemoglobin A1C   Results from last 7 days   Lab Units 07/25/23  0956   HEMOGLOBIN A1C % 8.4*       Lipid Profile:   Results from last 7 days   Lab Units 07/26/23  0428   TRIGLYCERIDES mg/dL 117   HDL mg/dL 19*       Cardiac testing:   No results found for this or any previous visit. No results found for this or any previous visit. No results found for this or any previous visit. No results found for this or any previous visit.       Meds/Allergies   all current active meds have been reviewed  Medications Prior to Admission   Medication   • apixaban (Eliquis) 5 mg   • atenolol (TENORMIN) 50 mg tablet   • buPROPion (ZYBAN) 150 MG 12 hr tablet   • Dapagliflozin-metFORMIN HCl ER (Xigduo XR)  MG TB24   • Fluticasone Furoate-Vilanterol (BREO ELLIPTA IN)   • isosorbide mononitrate (IMDUR) 30 mg 24 hr tablet   • ranolazine (RANEXA) 500 mg 12 hr tablet   • rosuvastatin (CRESTOR) 20 MG tablet   • ALBUTEROL IN          Assessment:  Principal Problem:    NSTEMI (non-ST elevated myocardial infarction) ([address])  Active Problems:    Type 2 diabetes mellitus (HCC)    Atrial fibrillation (HCC)    COPD (chronic obstructive pulmonary disease) (HCC)    Hyperlipidemia            Counseling / Coordination of Care  Total floor / unit time spent today 25 minutes. Greater than 50% of total time was spent with the patient and / or family counseling and / or coordination of care. A description of the counseling / coordination of care: Valarie Brooms

## 2023-07-29 NOTE — ASSESSMENT & PLAN NOTE
Lab Results   Component Value Date    HGBA1C 8.4 (H) 07/25/2023       Recent Labs     07/28/23  2038 07/29/23  0649 07/29/23  1112 07/29/23  1548   POCGLU 227* 212* 283* 152*       Blood Sugar Average: Last 72 hrs:  (P) 239.6   Blood sugar still not ideally controlled. Adjusted insulin regimen yesterday.   Monitor for today

## 2023-07-29 NOTE — PLAN OF CARE
Problem: PAIN - ADULT  Goal: Verbalizes/displays adequate comfort level or baseline comfort level  Description: Interventions:  - Encourage patient to monitor pain and request assistance  - Assess pain using appropriate pain scale  - Administer analgesics based on type and severity of pain and evaluate response  - Implement non-pharmacological measures as appropriate and evaluate response  - Consider cultural and social influences on pain and pain management  - Notify physician/advanced practitioner if interventions unsuccessful or patient reports new pain  Outcome: Progressing     Problem: INFECTION - ADULT  Goal: Absence or prevention of progression during hospitalization  Description: INTERVENTIONS:  - Assess and monitor for signs and symptoms of infection  - Monitor lab/diagnostic results  - Monitor all insertion sites, i.e. indwelling lines, tubes, and drains  - Monitor endotracheal if appropriate and nasal secretions for changes in amount and color  - [place] appropriate cooling/warming therapies per order  - Administer medications as ordered  - Instruct and encourage patient and family to use good hand hygiene technique  - Identify and instruct in appropriate isolation precautions for identified infection/condition  Outcome: Progressing  Goal: Absence of fever/infection during neutropenic period  Description: INTERVENTIONS:  - Monitor WBC    Outcome: Progressing     Problem: SAFETY ADULT  Goal: Patient will remain free of falls  Description: INTERVENTIONS:  - Educate patient/family on patient safety including physical limitations  - Instruct patient to call for assistance with activity   - Consult OT/PT to assist with strengthening/mobility   - Keep Call bell within reach  - Keep bed low and locked with side rails adjusted as appropriate  - Keep care items and personal belongings within reach  - Initiate and maintain comfort rounds  - Make Fall Risk Sign visible to staff  - Apply yellow socks and bracelet for high fall risk patients  - Consider moving patient to room near nurses station  Outcome: Progressing  Goal: Maintain or return to baseline ADL function  Description: INTERVENTIONS:  -  Assess patient's ability to carry out ADLs; assess patient's baseline for ADL function and identify physical deficits which impact ability to perform ADLs (bathing, care of mouth/teeth, toileting, grooming, dressing, etc.)  - Assess/evaluate cause of self-care deficits   - Assess range of motion  - Assess patient's mobility; develop plan if impaired  - Assess patient's need for assistive devices and provide as appropriate  - Encourage maximum independence but intervene and supervise when necessary  - Involve family in performance of ADLs  - Assess for home care needs following discharge   - Consider OT consult to assist with ADL evaluation and planning for discharge  - Provide patient education as appropriate  Outcome: Progressing  Goal: Maintains/Returns to pre admission functional level  Description: INTERVENTIONS:  - Perform BMAT or MOVE assessment daily.   - Set and communicate daily mobility goal to care team and patient/family/caregiver. - Collaborate with rehabilitation services on mobility goals if consulted  - Perform Range of Motion 3 times a day. - Reposition patient every 3 hours.   - Dangle patient 3 times a day  - Stand patient 3 times a day  - Ambulate patient 3 times a day  - Out of bed to chair 3 times a day   - Out of bed for meals 3 times a day  - Out of bed for toileting  - Record patient progress and toleration of activity level   Outcome: Progressing     Problem: DISCHARGE PLANNING  Goal: Discharge to home or other facility with appropriate resources  Description: INTERVENTIONS:  - Identify barriers to discharge w/patient and caregiver  - Arrange for needed discharge resources and transportation as appropriate  - Identify discharge learning needs (meds, wound care, etc.)  - Arrange for interpretive services to assist at discharge as needed  - Refer to Case Management Department for coordinating discharge planning if the patient needs post-hospital services based on physician/advanced practitioner order or complex needs related to functional status, cognitive ability, or social support system  Outcome: Progressing     Problem: Knowledge Deficit  Goal: Patient/family/caregiver demonstrates understanding of disease process, treatment plan, medications, and discharge instructions  Description: Complete learning assessment and assess knowledge base.   Interventions:  - Provide teaching at level of understanding  - Provide teaching via preferred learning methods  Outcome: Progressing     Problem: CARDIOVASCULAR - ADULT  Goal: Maintains optimal cardiac output and hemodynamic stability  Description: INTERVENTIONS:  - Monitor I/O, vital signs and rhythm  - Monitor for S/S and trends of decreased cardiac output  - Administer and titrate ordered vasoactive medications to optimize hemodynamic stability  - Assess quality of pulses, skin color and temperature  - Assess for signs of decreased coronary artery perfusion  - Instruct patient to report change in severity of symptoms  Outcome: Progressing  Goal: Absence of cardiac dysrhythmias or at baseline rhythm  Description: INTERVENTIONS:  - Continuous cardiac monitoring, vital signs, obtain 12 lead EKG if ordered  - Administer antiarrhythmic and heart rate control medications as ordered  - Monitor electrolytes and administer replacement therapy as ordered  Outcome: Progressing

## 2023-07-29 NOTE — ASSESSMENT & PLAN NOTE
· Patient with known history of coronary artery disease status post CABG. Presented to the hospital with shortness of breath. Started on heparin drip and transferred to [facility] for cardiac catheterization  · Patient with NSTEMI. Status just postcardiac catheterization showing diffuse disease  · Medical management  · Previous history of CABG  · Patient with worsening hypoxemia. When I evaluated him in the morning he was up to 5 L of oxygen. A CT was obtained. Was negative for any PE or any acute pulmonary process. Patient was complaining of pain on the side just below the left axillary area-we will give pain medication and also add lidocaine patch.   Continue with incentive spirometer

## 2023-07-29 NOTE — PROGRESS NOTES
[address] [facility]  Progress Note  Name: Lenoria Denver  MRN: [id number]  Unit/Bed#: [facility]PHP 800-29 I Date of Admission: 7/26/2023   Date of Service: 7/29/2023 I Hospital Day: 3    Assessment/Plan   * NSTEMI (non-ST elevated myocardial infarction) [facility])  Assessment & Plan  · Patient with known history of coronary artery disease status post CABG. Presented to the hospital with shortness of breath. Started on heparin drip and transferred to [facility] for cardiac catheterization  · Patient with NSTEMI. Status just postcardiac catheterization showing diffuse disease  · Medical management  · Previous history of CABG  · Patient with worsening hypoxemia. When I evaluated him in the morning he was up to 5 L of oxygen. A CT was obtained. Was negative for any PE or any acute pulmonary process. Patient was complaining of pain on the side just below the left axillary area-we will give pain medication and also add lidocaine patch. Continue with incentive spirometer    Hypoxemia  Assessment & Plan  · Patient required oxygen during the hospital stay. Today patient was up to 5 L nasal cannula. Patient does not require any oxygen as an outpatient. Obtained CT chest PE negative. Negative for any acute pulmonary  · Encourage incentive spirometry  · Pain control  · May have splinting from the pain on the left chest wall  ·     Hyperlipidemia  Assessment & Plan  · Continue with statin    COPD (chronic obstructive pulmonary disease) ([address])  Assessment & Plan  · No acute exacerbation  Continue with respiratory protocol    Atrial fibrillation [facility])  Assessment & Plan  · Patient with a history of atrial fibrillation.   Status post ablation in 2019  · Continue with Eliquis and atenolol  · Cardiology on board    Type 2 diabetes mellitus [facility])  Assessment & Plan  Lab Results   Component Value Date    HGBA1C 8.4 (H) 07/25/2023       Recent Labs     07/28/23 2038 07/29/23  0649 07/29/23  1112 23  1548   POCGLU 227* 212* 283* 152*       Blood Sugar Average: Last 72 hrs:  (P) 239.6   Blood sugar still not ideally controlled. Adjusted insulin regimen yesterday. Monitor for today             . VTE Pharmacologic Prophylaxis:   Moderate Risk (Score 3-4) - Pharmacological DVT Prophylaxis Ordered: apixaban (Eliquis). Patient Centered Rounds: I performed bedside rounds with nursing staff today. Discussions with Specialists or Other Care Team Provider:     Education and Discussions with Family / Patient: Updated  (mother) at bedside. Total Time Spent on Date of Encounter in care of patient: 35 minutes This time was spent on one or more of the following: performing physical exam; counseling and coordination of care; obtaining or reviewing history; documenting in the medical record; reviewing/ordering tests, medications or procedures; communicating with other healthcare professionals and discussing with patient's family/caregivers. Current Length of Stay: 3 day(s)  Current Patient Status: Inpatient   Certification Statement: The patient will continue to require additional inpatient hospital stay due to Hypoxia  Discharge Plan: Anticipate discharge in 24-48 hrs to home. Code Status: Level 1 - Full Code    Subjective:   Patient seen and examined. Patient was on 4 L of nasal cannula when I saw him in the morning. Discussed with cardiology. Ordered CTA by cardiology. Negative for PE any acute pulmonary process. Patient was complaining of chest wall pain on    Objective:     Vitals:   Temp (24hrs), Av.2 °F (36.8 °C), Min:97.9 °F (36.6 °C), Max:98.5 °F (36.9 °C)    Temp:  [97.9 °F (36.6 °C)-98.5 °F (36.9 °C)] 98.3 °F (36.8 °C)  HR:  [56-62] 57  Resp:  [16-18] 18  BP: (101-139)/(60-66) 105/63  SpO2:  [88 %-97 %] 90 %  Body mass index is 27.64 kg/m². Input and Output Summary (last 24 hours):      Intake/Output Summary (Last 24 hours) at 2023 3185  Last data filed at 7/29/2023 1705  Gross per 24 hour   Intake 720 ml   Output 1300 ml   Net -580 ml       Physical Exam:   Physical Exam  Constitutional:       General: He is not in acute distress. HENT:      Head: Normocephalic. Nose: Nose normal.   Eyes:      General: No scleral icterus. Cardiovascular:      Rate and Rhythm: Normal rate and regular rhythm. Heart sounds: Normal heart sounds. Pulmonary:      Comments: Decreased breath sounds bilateral  No wheezing  Chest:      Chest wall: Tenderness present. Abdominal:      General: There is no distension. Tenderness: There is no abdominal tenderness. Musculoskeletal:         General: Normal range of motion. Skin:     General: Skin is warm. Coloration: Skin is not jaundiced. Neurological:      Mental Status: He is alert. Mental status is at baseline.           Additional Data:     Labs:  Results from last 7 days   Lab Units 07/29/23  0610 07/27/23  0552   WBC Thousand/uL 12.57* 20.28*   HEMOGLOBIN g/dL 14.6 14.8   HEMATOCRIT % 47.1 49.5*   PLATELETS Thousands/uL 271 341   NEUTROS PCT %  --  86*   LYMPHS PCT %  --  6*   MONOS PCT %  --  6   EOS PCT %  --  0     Results from last 7 days   Lab Units 07/29/23  0610   SODIUM mmol/L 136   POTASSIUM mmol/L 4.1   CHLORIDE mmol/L 100   CO2 mmol/L 33*   BUN mg/dL 13   CREATININE mg/dL 0.73   ANION GAP mmol/L 3   CALCIUM mg/dL 8.6   ALBUMIN g/dL 2.8*   TOTAL BILIRUBIN mg/dL 0.34   ALK PHOS U/L 71   ALT U/L 21   AST U/L 11   GLUCOSE RANDOM mg/dL 227*     Results from last 7 days   Lab Units 07/25/23  0956   INR  1.07     Results from last 7 days   Lab Units 07/29/23  1548 07/29/23  1112 07/29/23  0649 07/28/23  2038 07/28/23  1641 07/28/23  1146 07/28/23  0640 07/27/23  2003 07/27/23  1610 07/27/23  0558 07/26/23  2310 07/26/23  1609   POC GLUCOSE mg/dl 152* 283* 212* 227* 196* 276* 226* 294* 284* 246* 326* 240*     Results from last 7 days   Lab Units 07/25/23  0956   HEMOGLOBIN A1C % 8.4*     Results from last 7 days   Lab Units 07/27/23  0552 07/26/23  0428 07/25/23  1036   LACTIC ACID mmol/L  --   --  0.8   PROCALCITONIN ng/ml <0.05 0.06 0.07       Lines/Drains:  Invasive Devices     Peripheral Intravenous Line  Duration           Peripheral IV 07/26/23 Right;Ventral (anterior) Forearm 3 days    Peripheral IV 07/29/23 Distal;Left;Upper;Ventral (anterior) Arm <1 day                  Telemetry:  Telemetry Orders (From admission, onward)             24 Hour Telemetry Monitoring  Continuous x 24 Hours (Telem)        Question:  Reason for 24 Hour Telemetry  Answer:  PCI/EP study (including pacer and ICD implementation), Cardiac surgery, MI, abnormal cardiac cath, and chest pain- rule out MI                 Telemetry Reviewed: Normal Sinus Rhythm  Indication for Continued Telemetry Use: nstemi            Imaging: Reviewed radiology reports from this admission including: chest CT scan    Recent Cultures (last 7 days):   Results from last 7 days   Lab Units 07/26/23  1702 07/26/23  1701 07/25/23  1717 07/25/23  1036   BLOOD CULTURE  No Growth at 48 hrs. No Growth at 48 hrs. --  No Growth After 4 Days.   Rothia mucilaginosa*   GRAM STAIN RESULT   --   --   --  Gram positive cocci in clusters*   LEGIONELLA URINARY ANTIGEN   --   --  Negative  --        Last 24 Hours Medication List:   Current Facility-Administered Medications   Medication Dose Route Frequency Provider Last Rate   • acetaminophen  650 mg Oral Q6H PRN Angle Reinoso PA-C     • apixaban  5 mg Oral BID Angle Reinoso PA-C     • aspirin  81 mg Oral Daily Angle Reinoso PA-C     • atenolol  50 mg Oral Daily Angle Reinoso PA-C     • atorvastatin  40 mg Oral Daily With [employer], PA-C     • budesonide  0.5 mg Nebulization Q12H Angle Reinoso PA-C     • buPROPion  150 mg Oral Daily Angle Reinoso PA-C     • calcium carbonate  500 mg Oral TID PRN Angle Reinoso PA-C     • diphenhydrAMINE  25 mg Intravenous Q6H PRN Emmett Douglass PA-C     • formoterol  20 mcg Nebulization Q12H Emmett Douglass PA-C     • guaiFENesin  600 mg Oral Q12H [address] [place] [employer] [place]     • insulin glargine  8 Units Subcutaneous HS Opal Mcclure MD     • insulin lispro  2-12 Units Subcutaneous TID [facility] Emmett Douglass PA-C     • ipratropium  0.5 mg Nebulization TID Emmett Douglass PA-C     • isosorbide mononitrate  60 mg Oral Daily Emmett Douglass PA-C     • levalbuterol  1.25 mg Nebulization TID Opal Mcclure MD     • [START ON 7/30/2023] lidocaine  1 patch Topical Daily Opal Mcclure MD     • nitroglycerin  0.4 mg Sublingual Q5 Min PRN Emmett Douglass PA-C     • ranolazine  500 mg Oral BID Emmett Douglass PA-C     • traMADol  50 mg Oral Q6H PRN Opal Mcclure MD          Today, Patient Was Seen By: Opal Mcclure MD    **Please Note: This note may have been constructed using a voice recognition system. **

## 2023-07-29 NOTE — ASSESSMENT & PLAN NOTE
· Patient required oxygen during the hospital stay. Today patient was up to 5 L nasal cannula. Patient does not require any oxygen as an outpatient. Obtained CT chest PE negative.   Negative for any acute pulmonary  · Encourage incentive spirometry  · Pain control  · May have splinting from the pain on the left chest wall  ·

## 2023-07-30 LAB
ANION GAP SERPL CALCULATED.3IONS-SCNC: 3 MMOL/L
BACTERIA BLD CULT: NORMAL
BUN SERPL-MCNC: 14 MG/DL (ref 5–25)
CALCIUM SERPL-MCNC: 8.7 MG/DL (ref 8.3–10.1)
CHLORIDE SERPL-SCNC: 99 MMOL/L (ref 96–108)
CO2 SERPL-SCNC: 33 MMOL/L (ref 21–32)
CREAT SERPL-MCNC: 0.72 MG/DL (ref 0.6–1.3)
ERYTHROCYTE [DISTWIDTH] IN BLOOD BY AUTOMATED COUNT: 17.1 % (ref 11.6–15.1)
GFR SERPL CREATININE-BSD FRML MDRD: 99 ML/MIN/1.73SQ M
GLUCOSE SERPL-MCNC: 171 MG/DL (ref 65–140)
GLUCOSE SERPL-MCNC: 194 MG/DL (ref 65–140)
GLUCOSE SERPL-MCNC: 199 MG/DL (ref 65–140)
GLUCOSE SERPL-MCNC: 284 MG/DL (ref 65–140)
GLUCOSE SERPL-MCNC: 311 MG/DL (ref 65–140)
HCT VFR BLD AUTO: 45.4 % (ref 36.5–49.3)
HGB BLD-MCNC: 14.4 G/DL (ref 12–17)
MCH RBC QN AUTO: 24.8 PG (ref 26.8–34.3)
MCHC RBC AUTO-ENTMCNC: 31.7 G/DL (ref 31.4–37.4)
MCV RBC AUTO: 78 FL (ref 82–98)
PLATELET # BLD AUTO: 252 THOUSANDS/UL (ref 149–390)
PMV BLD AUTO: 9.1 FL (ref 8.9–12.7)
POTASSIUM SERPL-SCNC: 4.2 MMOL/L (ref 3.5–5.3)
RBC # BLD AUTO: 5.8 MILLION/UL (ref 3.88–5.62)
SODIUM SERPL-SCNC: 135 MMOL/L (ref 135–147)
WBC # BLD AUTO: 14.1 THOUSAND/UL (ref 4.31–10.16)

## 2023-07-30 PROCEDURE — 80048 BASIC METABOLIC PNL TOTAL CA: CPT | Performed by: FAMILY MEDICINE

## 2023-07-30 PROCEDURE — 94664 DEMO&/EVAL PT USE INHALER: CPT

## 2023-07-30 PROCEDURE — 99232 SBSQ HOSP IP/OBS MODERATE 35: CPT | Performed by: FAMILY MEDICINE

## 2023-07-30 PROCEDURE — 82948 REAGENT STRIP/BLOOD GLUCOSE: CPT

## 2023-07-30 PROCEDURE — 94640 AIRWAY INHALATION TREATMENT: CPT

## 2023-07-30 PROCEDURE — 85027 COMPLETE CBC AUTOMATED: CPT | Performed by: FAMILY MEDICINE

## 2023-07-30 PROCEDURE — 99232 SBSQ HOSP IP/OBS MODERATE 35: CPT | Performed by: INTERNAL MEDICINE

## 2023-07-30 PROCEDURE — 94760 N-INVAS EAR/PLS OXIMETRY 1: CPT

## 2023-07-30 RX ORDER — INSULIN GLARGINE 100 [IU]/ML
15 INJECTION, SOLUTION SUBCUTANEOUS
Status: DISCONTINUED | OUTPATIENT
Start: 2023-07-30 | End: 2023-07-31 | Stop reason: HOSPADM

## 2023-07-30 RX ORDER — INSULIN LISPRO 100 [IU]/ML
3 INJECTION, SOLUTION INTRAVENOUS; SUBCUTANEOUS
Status: DISCONTINUED | OUTPATIENT
Start: 2023-07-30 | End: 2023-07-31 | Stop reason: HOSPADM

## 2023-07-30 RX ADMIN — APIXABAN 5 MG: 5 TABLET, FILM COATED ORAL at 17:07

## 2023-07-30 RX ADMIN — LEVALBUTEROL HYDROCHLORIDE 1.25 MG: 1.25 SOLUTION RESPIRATORY (INHALATION) at 20:06

## 2023-07-30 RX ADMIN — GUAIFENESIN 600 MG: 600 TABLET, EXTENDED RELEASE ORAL at 22:10

## 2023-07-30 RX ADMIN — RANOLAZINE 500 MG: 500 TABLET, FILM COATED, EXTENDED RELEASE ORAL at 09:25

## 2023-07-30 RX ADMIN — INSULIN LISPRO 3 UNITS: 100 INJECTION, SOLUTION INTRAVENOUS; SUBCUTANEOUS at 11:41

## 2023-07-30 RX ADMIN — BUDESONIDE 0.5 MG: 0.5 INHALANT RESPIRATORY (INHALATION) at 20:06

## 2023-07-30 RX ADMIN — INSULIN LISPRO 3 UNITS: 100 INJECTION, SOLUTION INTRAVENOUS; SUBCUTANEOUS at 17:07

## 2023-07-30 RX ADMIN — FORMOTEROL FUMARATE DIHYDRATE 20 MCG: 20 SOLUTION RESPIRATORY (INHALATION) at 07:10

## 2023-07-30 RX ADMIN — APIXABAN 5 MG: 5 TABLET, FILM COATED ORAL at 09:25

## 2023-07-30 RX ADMIN — INSULIN LISPRO 2 UNITS: 100 INJECTION, SOLUTION INTRAVENOUS; SUBCUTANEOUS at 17:07

## 2023-07-30 RX ADMIN — IPRATROPIUM BROMIDE 0.5 MG: 0.5 SOLUTION RESPIRATORY (INHALATION) at 07:10

## 2023-07-30 RX ADMIN — INSULIN LISPRO 8 UNITS: 100 INJECTION, SOLUTION INTRAVENOUS; SUBCUTANEOUS at 11:28

## 2023-07-30 RX ADMIN — LEVALBUTEROL HYDROCHLORIDE 1.25 MG: 1.25 SOLUTION RESPIRATORY (INHALATION) at 14:06

## 2023-07-30 RX ADMIN — ATORVASTATIN CALCIUM 40 MG: 40 TABLET, FILM COATED ORAL at 17:07

## 2023-07-30 RX ADMIN — ASPIRIN 81 MG CHEWABLE TABLET 81 MG: 81 TABLET CHEWABLE at 09:25

## 2023-07-30 RX ADMIN — GUAIFENESIN 600 MG: 600 TABLET, EXTENDED RELEASE ORAL at 09:25

## 2023-07-30 RX ADMIN — RANOLAZINE 500 MG: 500 TABLET, FILM COATED, EXTENDED RELEASE ORAL at 17:07

## 2023-07-30 RX ADMIN — FORMOTEROL FUMARATE DIHYDRATE 20 MCG: 20 SOLUTION RESPIRATORY (INHALATION) at 20:06

## 2023-07-30 RX ADMIN — BUDESONIDE 0.5 MG: 0.5 INHALANT RESPIRATORY (INHALATION) at 07:10

## 2023-07-30 RX ADMIN — BUPROPION HYDROCHLORIDE 150 MG: 150 TABLET, FILM COATED, EXTENDED RELEASE ORAL at 09:25

## 2023-07-30 RX ADMIN — IPRATROPIUM BROMIDE 0.5 MG: 0.5 SOLUTION RESPIRATORY (INHALATION) at 20:06

## 2023-07-30 RX ADMIN — ATENOLOL 50 MG: 50 TABLET ORAL at 09:25

## 2023-07-30 RX ADMIN — INSULIN LISPRO 2 UNITS: 100 INJECTION, SOLUTION INTRAVENOUS; SUBCUTANEOUS at 07:43

## 2023-07-30 RX ADMIN — LEVALBUTEROL HYDROCHLORIDE 1.25 MG: 1.25 SOLUTION RESPIRATORY (INHALATION) at 07:10

## 2023-07-30 RX ADMIN — ISOSORBIDE MONONITRATE 60 MG: 60 TABLET, EXTENDED RELEASE ORAL at 09:25

## 2023-07-30 RX ADMIN — IPRATROPIUM BROMIDE 0.5 MG: 0.5 SOLUTION RESPIRATORY (INHALATION) at 14:06

## 2023-07-30 RX ADMIN — INSULIN GLARGINE 15 UNITS: 100 INJECTION, SOLUTION SUBCUTANEOUS at 22:10

## 2023-07-30 NOTE — ASSESSMENT & PLAN NOTE
· No acute exacerbation  Continue with respiratory protocol  Patient with persistent hypoxemia-did not appear to be in any acute exacerbation.   Will current to pulmonology

## 2023-07-30 NOTE — ASSESSMENT & PLAN NOTE
· Patient required oxygen during the hospital stay. Today patient was up to 5 L nasal cannula. Patient does not require any oxygen as an outpatient. Obtained CT chest PE negative. Negative for any acute pulmonary  · Encourage incentive spirometry  · Patient with known history of COPD. Patient is on respiratory protocol do not appear to be in any acute exacerbation. Patient with persistent hypoxemia.   Will consult pulmonary

## 2023-07-30 NOTE — PLAN OF CARE
Problem: PAIN - ADULT  Goal: Verbalizes/displays adequate comfort level or baseline comfort level  Description: Interventions:  - Encourage patient to monitor pain and request assistance  - Assess pain using appropriate pain scale  - Administer analgesics based on type and severity of pain and evaluate response  - Implement non-pharmacological measures as appropriate and evaluate response  - Consider cultural and social influences on pain and pain management  - Notify physician/advanced practitioner if interventions unsuccessful or patient reports new pain  Outcome: Progressing     Problem: INFECTION - ADULT  Goal: Absence or prevention of progression during hospitalization  Description: INTERVENTIONS:  - Assess and monitor for signs and symptoms of infection  - Monitor lab/diagnostic results  - Monitor all insertion sites, i.e. indwelling lines, tubes, and drains  - Monitor endotracheal if appropriate and nasal secretions for changes in amount and color  - [place] appropriate cooling/warming therapies per order  - Administer medications as ordered  - Instruct and encourage patient and family to use good hand hygiene technique  - Identify and instruct in appropriate isolation precautions for identified infection/condition  Outcome: Progressing  Goal: Absence of fever/infection during neutropenic period  Description: INTERVENTIONS:  - Monitor WBC    Outcome: Progressing     Problem: SAFETY ADULT  Goal: Patient will remain free of falls  Description: INTERVENTIONS:  - Educate patient/family on patient safety including physical limitations  - Instruct patient to call for assistance with activity   - Consult OT/PT to assist with strengthening/mobility   - Keep Call bell within reach  - Keep bed low and locked with side rails adjusted as appropriate  - Keep care items and personal belongings within reach  - Initiate and maintain comfort rounds  - Make Fall Risk Sign visible to staff  - Apply yellow socks and bracelet for high fall risk patients  - Consider moving patient to room near nurses station  Outcome: Progressing  Goal: Maintain or return to baseline ADL function  Description: INTERVENTIONS:  -  Assess patient's ability to carry out ADLs; assess patient's baseline for ADL function and identify physical deficits which impact ability to perform ADLs (bathing, care of mouth/teeth, toileting, grooming, dressing, etc.)  - Assess/evaluate cause of self-care deficits   - Assess range of motion  - Assess patient's mobility; develop plan if impaired  - Assess patient's need for assistive devices and provide as appropriate  - Encourage maximum independence but intervene and supervise when necessary  - Involve family in performance of ADLs  - Assess for home care needs following discharge   - Consider OT consult to assist with ADL evaluation and planning for discharge  - Provide patient education as appropriate  Outcome: Progressing  Goal: Maintains/Returns to pre admission functional level  Description: INTERVENTIONS:  - Perform BMAT or MOVE assessment daily.   - Set and communicate daily mobility goal to care team and patient/family/caregiver. - Collaborate with rehabilitation services on mobility goals if consulted  - Perform Range of Motion 3 times a day. - Reposition patient every 3 hours.   - Dangle patient 3 times a day  - Stand patient 3 times a day  - Ambulate patient 3 times a day  - Out of bed to chair 3 times a day   - Out of bed for meals 3 times a day  - Out of bed for toileting  - Record patient progress and toleration of activity level   Outcome: Progressing     Problem: DISCHARGE PLANNING  Goal: Discharge to home or other facility with appropriate resources  Description: INTERVENTIONS:  - Identify barriers to discharge w/patient and caregiver  - Arrange for needed discharge resources and transportation as appropriate  - Identify discharge learning needs (meds, wound care, etc.)  - Arrange for interpretive services to assist at discharge as needed  - Refer to Case Management Department for coordinating discharge planning if the patient needs post-hospital services based on physician/advanced practitioner order or complex needs related to functional status, cognitive ability, or social support system  Outcome: Progressing     Problem: Knowledge Deficit  Goal: Patient/family/caregiver demonstrates understanding of disease process, treatment plan, medications, and discharge instructions  Description: Complete learning assessment and assess knowledge base.   Interventions:  - Provide teaching at level of understanding  - Provide teaching via preferred learning methods  Outcome: Progressing     Problem: CARDIOVASCULAR - ADULT  Goal: Maintains optimal cardiac output and hemodynamic stability  Description: INTERVENTIONS:  - Monitor I/O, vital signs and rhythm  - Monitor for S/S and trends of decreased cardiac output  - Administer and titrate ordered vasoactive medications to optimize hemodynamic stability  - Assess quality of pulses, skin color and temperature  - Assess for signs of decreased coronary artery perfusion  - Instruct patient to report change in severity of symptoms  Outcome: Progressing  Goal: Absence of cardiac dysrhythmias or at baseline rhythm  Description: INTERVENTIONS:  - Continuous cardiac monitoring, vital signs, obtain 12 lead EKG if ordered  - Administer antiarrhythmic and heart rate control medications as ordered  - Monitor electrolytes and administer replacement therapy as ordered  Outcome: Progressing

## 2023-07-30 NOTE — PROGRESS NOTES
[address] [facility]  Progress Note  Name: Jonny Marcial  MRN: [id number]  Unit/Bed#: [facility]PHP 196-16 I Date of Admission: 7/26/2023   Date of Service: 7/30/2023 I Hospital Day: 4    Assessment/Plan   * NSTEMI (non-ST elevated myocardial infarction) [facility])  Assessment & Plan  ·   · Patient with known history of coronary artery disease status post CABG. Presented to the hospital with shortness of breath. Started on heparin drip and transferred to [facility] [place] for cardiac catheterization  · Patient with NSTEMI. Status just postcardiac catheterization showing diffuse disease  · Medical management  · Previous history of CABG  · Patient with worsening hypoxemia. When I evaluated him in the morning he was up to 5 L of oxygen. A CT was obtained. Was negative for any PE or any acute pulmonary process. Patient was complaining of pain on the side just below the left axillary area-we will give pain medication and also add lidocaine patch. Continue with incentive spirometer  · Discussed with cardiology. Patient is stable from cardiology standpoint for discharge    Hypoxemia  Assessment & Plan  · Patient required oxygen during the hospital stay. Today patient was up to 5 L nasal cannula. Patient does not require any oxygen as an outpatient. Obtained CT chest PE negative. Negative for any acute pulmonary  · Encourage incentive spirometry  · Patient with known history of COPD. Patient is on respiratory protocol do not appear to be in any acute exacerbation. Patient with persistent hypoxemia. Will consult pulmonary      Hyperlipidemia  Assessment & Plan  · Continue with statin    COPD (chronic obstructive pulmonary disease) (HCC)  Assessment & Plan  · No acute exacerbation  Continue with respiratory protocol  Patient with persistent hypoxemia-did not appear to be in any acute exacerbation.   Will current to pulmonology      Atrial fibrillation [facility])  Assessment & Plan  · Patient with a history of atrial fibrillation. Status post ablation in 2019  · Continue with Eliquis and atenolol  · Cardiology on board    Type 2 diabetes mellitus [facility])  Assessment & Plan  Lab Results   Component Value Date    HGBA1C 8.4 (H) 07/25/2023       Recent Labs     07/29/23  2038 07/30/23  0619 07/30/23  1121 07/30/23  1608   POCGLU 268* 199* 311* 171*       Blood Sugar Average: Last 72 hrs:  (P) [id number]   Blood sugar still not ideally controlled. Uptitrate the dose of Lantus  Blood sugar need better blood sugar control before discharge           VTE Pharmacologic Prophylaxis:   Moderate Risk (Score 3-4) - Pharmacological DVT Prophylaxis Ordered: apixaban (Eliquis). Patient Centered Rounds: I performed bedside rounds with nursing staff today. Discussions with Specialists or Other Care Team Provider:     Education and Discussions with Family / Patient: Updated patient. Total Time Spent on Date of Encounter in care of patient: 35 minutes This time was spent on one or more of the following: performing physical exam; counseling and coordination of care; obtaining or reviewing history; documenting in the medical record; reviewing/ordering tests, medications or procedures; communicating with other healthcare professionals and discussing with patient's family/caregivers. Current Length of Stay: 4 day(s)  Current Patient Status: Inpatient   Certification Statement: The patient will continue to require additional inpatient hospital stay due to Hypoxemia/uncontrolled blood sugar  Discharge Plan: Anticipate discharge tomorrow to home. Code Status: Level 1 - Full Code    Subjective:   Patient seen and examined. Patient still with persistently requiring oxygen. Patient with previous history of COPD and no PFTs available evidence of COPD on imaging, by reviewing records patient has not seen a pulmonologist as outpatient.   Due to persistent hypoxemia we may need a pulmonology evaluation    Objective: Vitals:   Temp (24hrs), Av.8 °F (37.1 °C), Min:98.3 °F (36.8 °C), Max:99.4 °F (37.4 °C)    Temp:  [98.3 °F (36.8 °C)-99.4 °F (37.4 °C)] 99.1 °F (37.3 °C)  HR:  [63-72] 69  Resp:  [18-20] 18  BP: (111-138)/(59-77) 111/69  SpO2:  [87 %-96 %] 89 %  Body mass index is 27.73 kg/m². Input and Output Summary (last 24 hours): Intake/Output Summary (Last 24 hours) at 2023 1707  Last data filed at 2023 1635  Gross per 24 hour   Intake 480 ml   Output 1800 ml   Net -1320 ml       Physical Exam:   Physical Exam  Constitutional:       General: He is not in acute distress. HENT:      Head: Normocephalic. Nose: Nose normal.   Eyes:      General: No scleral icterus. Cardiovascular:      Rate and Rhythm: Normal rate and regular rhythm. Heart sounds: Normal heart sounds. Pulmonary:      Breath sounds: No wheezing, rhonchi or rales. Comments: Decreased breath sounds bilateral  Abdominal:      General: Bowel sounds are normal. There is no distension. Palpations: There is no mass. Tenderness: There is no abdominal tenderness. Musculoskeletal:         General: Normal range of motion. Cervical back: Normal range of motion. Skin:     General: Skin is warm. Coloration: Skin is not jaundiced. Neurological:      Mental Status: He is alert. Mental status is at baseline. Additional Data:     Labs:  Results from last 7 days   Lab Units 23  0559 23  0610 23  0552   WBC Thousand/uL 14.10*   < > 20.28*   HEMOGLOBIN g/dL 14.4   < > 14.8   HEMATOCRIT % 45.4   < > 49.5*   PLATELETS Thousands/uL 252   < > 341   NEUTROS PCT %  --   --  86*   LYMPHS PCT %  --   --  6*   MONOS PCT %  --   --  6   EOS PCT %  --   --  0    < > = values in this interval not displayed.      Results from last 7 days   Lab Units 23  0559 23  0610   SODIUM mmol/L 135 136   POTASSIUM mmol/L 4.2 4.1   CHLORIDE mmol/L 99 100   CO2 mmol/L 33* 33*   BUN mg/dL 14 13   CREATININE mg/dL 0.72 0.73   ANION GAP mmol/L 3 3   CALCIUM mg/dL 8.7 8.6   ALBUMIN g/dL  --  2.8*   TOTAL BILIRUBIN mg/dL  --  0.34   ALK PHOS U/L  --  71   ALT U/L  --  21   AST U/L  --  11   GLUCOSE RANDOM mg/dL 194* 227*     Results from last 7 days   Lab Units 07/25/23  0956   INR  1.07     Results from last 7 days   Lab Units 07/30/23  1608 07/30/23  1121 07/30/23  0619 07/29/23  2038 07/29/23  1548 07/29/23  1112 07/29/23  0649 07/28/23  2038 07/28/23  1641 07/28/23  1146 07/28/23  0640 07/27/23 2003   POC GLUCOSE mg/dl 171* 311* 199* 268* 152* 283* 212* 227* 196* 276* 226* 294*     Results from last 7 days   Lab Units 07/25/23  0956   HEMOGLOBIN A1C % 8.4*     Results from last 7 days   Lab Units 07/27/23  0552 07/26/23  0428 07/25/23  1036   LACTIC ACID mmol/L  --   --  0.8   PROCALCITONIN ng/ml <0.05 0.06 0.07       Lines/Drains:  Invasive Devices     Peripheral Intravenous Line  Duration           Peripheral IV 07/29/23 Distal;Left;Upper;Ventral (anterior) Arm 1 day                  Telemetry:  Telemetry Orders (From admission, onward)             24 Hour Telemetry Monitoring  Continuous x 24 Hours (Telem)        Question:  Reason for 24 Hour Telemetry  Answer:  PCI/EP study (including pacer and ICD implementation), Cardiac surgery, MI, abnormal cardiac cath, and chest pain- rule out MI                 Telemetry Reviewed: Normal Sinus Rhythm  Indication for Continued Telemetry Use: No indication for continued use. Will discontinue. Imaging: No pertinent imaging reviewed. Recent Cultures (last 7 days):   Results from last 7 days   Lab Units 07/26/23  1702 07/26/23  1701 07/25/23  1717 07/25/23  1036   BLOOD CULTURE  No Growth at 72 hrs. No Growth at 72 hrs.  --  No Growth After 5 Days.   Rothia mucilaginosa*   GRAM STAIN RESULT   --   --   --  Gram positive cocci in clusters*   LEGIONELLA URINARY ANTIGEN   --   --  Negative  --        Last 24 Hours Medication List:   Current Facility-Administered Medications   Medication Dose Route Frequency Provider Last Rate   • acetaminophen  650 mg Oral Q6H PRN Brigida Schwab PA-C     • apixaban  5 mg Oral BID Brigida Schwab PA-C     • aspirin  81 mg Oral Daily Brigida Schwab PA-C     • atenolol  50 mg Oral Daily Brigida Schwab PA-C     • atorvastatin  40 mg Oral Daily With [employer], PA-C     • budesonide  0.5 mg Nebulization Q12H Brigida Schwab PA-C     • buPROPion  150 mg Oral Daily Brigida Schwab PA-C     • calcium carbonate  500 mg Oral TID PRN Brigida Schwab PA-C     • diphenhydrAMINE  25 mg Intravenous Q6H PRN Brigida Schwab PA-C     • formoterol  20 mcg Nebulization Q12H Brigida Schwab PA-C     • guaiFENesin  600 mg Oral Q12H [address] Brigida Schwab PA-C     • insulin glargine  15 Units Subcutaneous HS Eitan Casiano MD     • insulin lispro  2-12 Units Subcutaneous TID [facility] Brigida Schwab PA-C     • insulin lispro  3 Units Subcutaneous TID With Meals Eitan Casiano MD     • ipratropium  0.5 mg Nebulization TID Brigida Schwab PA-C     • isosorbide mononitrate  60 mg Oral Daily Brigida Schwab PA-C     • levalbuterol  1.25 mg Nebulization TID Eitan Casiano MD     • lidocaine  1 patch Topical Daily Eitan Casiano MD     • nitroglycerin  0.4 mg Sublingual Q5 Min PRN Brigida Schwab PA-C     • ranolazine  500 mg Oral BID Brigida Schwab PA-C     • traMADol  50 mg Oral Q6H PRN Eitan Casiano MD          Today, Patient Was Seen By: Eitan Casiano MD    **Please Note: This note may have been constructed using a voice recognition system. **

## 2023-07-30 NOTE — PROGRESS NOTES
Cardiology Progress Note Hernan Townsend 60 y.o. male MRN: [id number]    Unit/Bed#: [facility] 528-01 Encounter: [id number]  Assessment and plan  #1 non-STEMI type II  #2 coronary artery disease with history of CABG occluded graft to the right, patent graft to LIMA and OM  #3 hyperlipidemia  #4 paroxysmal atrial fibrillation  #5 COPD  #6 diabetes mellitus type 2  #7 acute hypoxemic respiratory failure    Recommendations: CT of the chest did not show any pulmonary embolism. There is some underlying COPD but overall clear lung fields. Likely atelectasis continue incentive spirometry. From a cardiac standpoint he is stable without angina on current medical regimen stable for discharge from my standpoint. Continue  Imdur and Ranexa for chronic anginal symptoms due to the occluded graft to the right there is a small collateral network from the distal marginal.  Follow-up as documented please call if any questions    Subjective:    No significant events overnight. Overall feels much better denies any chest pain or anginal symptoms. Breathing is more comfortable today. Oxygen requirements have come down from 5 to 2 L.  ROS    Objective:   Vitals: Blood pressure 133/77, pulse 72, temperature 98.3 °F (36.8 °C), resp. rate 20, height 5' 9" (1.753 m), weight 85.2 kg (187 lb 12.8 oz), SpO2 93 %. , Body mass index is 27.73 kg/m².,   Orthostatic Blood Pressures    Flowsheet Row Most Recent Value   Blood Pressure 133/77 filed at 07/30/2023 0750   Patient Position - Orthostatic VS Lying filed at 07/29/2023 5297         Systolic (47YMM), REO:487 , Min:101 , JGX:166     Diastolic (50QRW), TCC:83, Min:62, Max:77      Intake/Output Summary (Last 24 hours) at 7/30/2023 0916  Last data filed at 7/30/2023 0001  Gross per 24 hour   Intake 600 ml   Output 1800 ml   Net -1200 ml     Weight (last 2 days)     Date/Time Weight    07/30/23 03:08:48 85.2 (187.8)    07/29/23 0600 84.9 (187.2)    07/28/23 0348 87.2 (192.2)            Telemetry Review: No significant arrhythmias seen on telemetry review. EKG personally reviewed by Theodore Garcia DO. Physical Exam:  Vital signs reviewed  General:  Alert and cooperative, appears stated age, no acute distress  HEENT:  PERRLA, EOMI, no scleral icterus, no conjunctival pallor  Neck:  No lymphadenopathy, no thyromegaly, no carotid bruits, no elevated JVP  Heart:  Regular rate and rhythm, normal S1/S2, no S3/S4, no murmur, rubs or gallops. PMI nondisplaced  Lungs:  Clear to auscultation bilaterally, no wheezes rales or rhonchi  Abdomen:  Soft, non-tender, positive bowel sounds, no rebound or guarding,   no organomegaly   Extremities:  Normal range of motion.   No clubbing, cyanosis or edema   Vascular:  2+ pedal pulses  Skin:  No rashes or lesions on exposed skin  Neurologic:  Cranial nerves II-XII grossly intact without focal deficits  Psych:  Normal mood and affect      Laboratory Results:        CBC with diff:   Results from last 7 days   Lab Units 07/30/23  0559 07/29/23  0610 07/27/23  0552 07/26/23  0428 07/25/23  0956   WBC Thousand/uL 14.10* 12.57* 20.28* 15.01* 16.15*   HEMOGLOBIN g/dL 14.4 14.6 14.8 13.9 14.7   HEMATOCRIT % 45.4 47.1 49.5* 46.0 47.8   MCV fL 78* 80* 80* 80* 80*   PLATELETS Thousands/uL 252 271 341 294 293   RBC Million/uL 5.80* 5.89* 6.18* 5.76* 6.00*   MCH pg 24.8* 24.8* 23.9* 24.1* 24.5*   MCHC g/dL 31.7 31.0* 29.9* 30.2* 30.8*   RDW % 17.1* 17.2* 17.9* 17.2* 17.7*   MPV fL 9.1 9.4 9.2 9.1 9.4   NRBC AUTO /100 WBCs  --   --  0 0 0         CMP:  Results from last 7 days   Lab Units 07/30/23  0559 07/29/23  0610 07/28/23  1207 07/27/23  0552 07/26/23  0428 07/25/23  0956   POTASSIUM mmol/L 4.2 4.1 4.1 4.3 4.9 4.3   CHLORIDE mmol/L 99 100 98 101 100 97   CO2 mmol/L 33* 33* 32 33* 29 26   BUN mg/dL 14 13 18 21 23 16   CREATININE mg/dL 0.72 0.73 0.73 0.83 0.77 0.71   CALCIUM mg/dL 8.7 8.6 8.8 9.4 8.6 8.9   AST U/L  --  11  --   --  12* 9*   ALT U/L  --  21  --   --  10 9   ALK PHOS U/L  --  71  --   --  64 75   EGFR ml/min/1.73sq m 99 98 98 93 96 99         BMP:  Results from last 7 days   Lab Units 07/30/23  0559 07/29/23  0610 07/28/23  1207 07/27/23  0552 07/26/23  0428 07/25/23  0956   POTASSIUM mmol/L 4.2 4.1 4.1 4.3 4.9 4.3   CHLORIDE mmol/L 99 100 98 101 100 97   CO2 mmol/L 33* 33* 32 33* 29 26   BUN mg/dL 14 13 18 21 23 16   CREATININE mg/dL 0.72 0.73 0.73 0.83 0.77 0.71   CALCIUM mg/dL 8.7 8.6 8.8 9.4 8.6 8.9       BNP:   Recent Labs     07/28/23  1442   *       Magnesium:   Results from last 7 days   Lab Units 07/28/23  1207 07/27/23  0552 07/26/23  1804 07/26/23  0428   MAGNESIUM mg/dL 2.2 2.4 2.1 2.4       Coags:   Results from last 7 days   Lab Units 07/27/23  0552 07/26/23  2314 07/26/23  1702 07/26/23  1113 07/26/23  0428 07/25/23  0956   PTT seconds 61* 39* 45* 34 36 29   INR   --   --   --   --   --  1.07       TSH:        Hemoglobin A1C   Results from last 7 days   Lab Units 07/25/23  0956   HEMOGLOBIN A1C % 8.4*       Lipid Profile:   Results from last 7 days   Lab Units 07/26/23  0428   TRIGLYCERIDES mg/dL 117   HDL mg/dL 19*       Cardiac testing:   No results found for this or any previous visit. No results found for this or any previous visit. No results found for this or any previous visit. No results found for this or any previous visit.       Meds/Allergies   all current active meds have been reviewed  Medications Prior to Admission   Medication   • apixaban (Eliquis) 5 mg   • atenolol (TENORMIN) 50 mg tablet   • buPROPion (ZYBAN) 150 MG 12 hr tablet   • Dapagliflozin-metFORMIN HCl ER (Xigduo XR)  MG TB24   • Fluticasone Furoate-Vilanterol (BREO ELLIPTA IN)   • isosorbide mononitrate (IMDUR) 30 mg 24 hr tablet   • ranolazine (RANEXA) 500 mg 12 hr tablet   • rosuvastatin (CRESTOR) 20 MG tablet   • ALBUTEROL IN          Assessment:  Principal Problem:    NSTEMI (non-ST elevated myocardial infarction) ([address])  Active Problems:    Type 2 diabetes mellitus ([address])    Atrial fibrillation (HCC)    COPD (chronic obstructive pulmonary disease) (HCC)    Hyperlipidemia    Hypoxemia            Counseling / Coordination of Care  Total floor / unit time spent today 25 minutes. Greater than 50% of total time was spent with the patient and / or family counseling and / or coordination of care. A description of the counseling / coordination of care: Francisco J Thayer

## 2023-07-30 NOTE — ASSESSMENT & PLAN NOTE
Lab Results   Component Value Date    HGBA1C 8.4 (H) 07/25/2023       Recent Labs     07/29/23  2038 07/30/23  0619 07/30/23  1121 07/30/23  1608   POCGLU 268* 199* 311* 171*       Blood Sugar Average: Last 72 hrs:  (P) [id number]   Blood sugar still not ideally controlled.   Uptitrate the dose of Lantus  Blood sugar need better blood sugar control before discharge

## 2023-07-30 NOTE — ASSESSMENT & PLAN NOTE
·   · Patient with known history of coronary artery disease status post CABG. Presented to the hospital with shortness of breath. Started on heparin drip and transferred to [facility] for cardiac catheterization  · Patient with NSTEMI. Status just postcardiac catheterization showing diffuse disease  · Medical management  · Previous history of CABG  · Patient with worsening hypoxemia. When I evaluated him in the morning he was up to 5 L of oxygen. A CT was obtained. Was negative for any PE or any acute pulmonary process. Patient was complaining of pain on the side just below the left axillary area-we will give pain medication and also add lidocaine patch. Continue with incentive spirometer  · Discussed with cardiology.   Patient is stable from cardiology standpoint for discharge

## 2023-07-31 VITALS
BODY MASS INDEX: 27.62 KG/M2 | HEIGHT: 69 IN | DIASTOLIC BLOOD PRESSURE: 72 MMHG | SYSTOLIC BLOOD PRESSURE: 140 MMHG | HEART RATE: 66 BPM | RESPIRATION RATE: 17 BRPM | WEIGHT: 186.51 LBS | TEMPERATURE: 98.5 F | OXYGEN SATURATION: 91 %

## 2023-07-31 PROBLEM — R09.02 HYPOXEMIA: Status: RESOLVED | Noted: 2023-07-29 | Resolved: 2023-07-31

## 2023-07-31 LAB
GLUCOSE SERPL-MCNC: 204 MG/DL (ref 65–140)
GLUCOSE SERPL-MCNC: 280 MG/DL (ref 65–140)
GLUCOSE SERPL-MCNC: 288 MG/DL (ref 65–140)

## 2023-07-31 PROCEDURE — 99239 HOSP IP/OBS DSCHRG MGMT >30: CPT | Performed by: FAMILY MEDICINE

## 2023-07-31 PROCEDURE — 94761 N-INVAS EAR/PLS OXIMETRY MLT: CPT

## 2023-07-31 PROCEDURE — 94640 AIRWAY INHALATION TREATMENT: CPT

## 2023-07-31 PROCEDURE — 94664 DEMO&/EVAL PT USE INHALER: CPT

## 2023-07-31 PROCEDURE — 94760 N-INVAS EAR/PLS OXIMETRY 1: CPT

## 2023-07-31 PROCEDURE — 82948 REAGENT STRIP/BLOOD GLUCOSE: CPT

## 2023-07-31 PROCEDURE — 99223 1ST HOSP IP/OBS HIGH 75: CPT | Performed by: INTERNAL MEDICINE

## 2023-07-31 RX ORDER — ASPIRIN 81 MG/1
81 TABLET, CHEWABLE ORAL DAILY
Refills: 0
Start: 2023-08-01

## 2023-07-31 RX ORDER — ALBUTEROL SULFATE 90 UG/1
2 AEROSOL, METERED RESPIRATORY (INHALATION) EVERY 4 HOURS PRN
Qty: 6.7 G | Refills: 0
Start: 2023-07-31

## 2023-07-31 RX ORDER — NITROGLYCERIN 0.4 MG/1
0.4 TABLET SUBLINGUAL
Qty: 30 TABLET | Refills: 0 | Status: SHIPPED | OUTPATIENT
Start: 2023-07-31

## 2023-07-31 RX ORDER — ISOSORBIDE MONONITRATE 30 MG/1
60 TABLET, EXTENDED RELEASE ORAL DAILY
Qty: 30 TABLET | Refills: 0 | Status: SHIPPED | OUTPATIENT
Start: 2023-07-31

## 2023-07-31 RX ADMIN — ISOSORBIDE MONONITRATE 60 MG: 60 TABLET, EXTENDED RELEASE ORAL at 08:52

## 2023-07-31 RX ADMIN — LEVALBUTEROL HYDROCHLORIDE 1.25 MG: 1.25 SOLUTION RESPIRATORY (INHALATION) at 14:44

## 2023-07-31 RX ADMIN — ATENOLOL 50 MG: 50 TABLET ORAL at 08:52

## 2023-07-31 RX ADMIN — IPRATROPIUM BROMIDE 0.5 MG: 0.5 SOLUTION RESPIRATORY (INHALATION) at 07:10

## 2023-07-31 RX ADMIN — RANOLAZINE 500 MG: 500 TABLET, FILM COATED, EXTENDED RELEASE ORAL at 08:52

## 2023-07-31 RX ADMIN — INSULIN LISPRO 4 UNITS: 100 INJECTION, SOLUTION INTRAVENOUS; SUBCUTANEOUS at 08:52

## 2023-07-31 RX ADMIN — IPRATROPIUM BROMIDE 0.5 MG: 0.5 SOLUTION RESPIRATORY (INHALATION) at 14:44

## 2023-07-31 RX ADMIN — APIXABAN 5 MG: 5 TABLET, FILM COATED ORAL at 08:52

## 2023-07-31 RX ADMIN — GUAIFENESIN 600 MG: 600 TABLET, EXTENDED RELEASE ORAL at 08:52

## 2023-07-31 RX ADMIN — INSULIN LISPRO 3 UNITS: 100 INJECTION, SOLUTION INTRAVENOUS; SUBCUTANEOUS at 08:53

## 2023-07-31 RX ADMIN — INSULIN LISPRO 6 UNITS: 100 INJECTION, SOLUTION INTRAVENOUS; SUBCUTANEOUS at 11:29

## 2023-07-31 RX ADMIN — FORMOTEROL FUMARATE DIHYDRATE 20 MCG: 20 SOLUTION RESPIRATORY (INHALATION) at 07:16

## 2023-07-31 RX ADMIN — LEVALBUTEROL HYDROCHLORIDE 1.25 MG: 1.25 SOLUTION RESPIRATORY (INHALATION) at 07:10

## 2023-07-31 RX ADMIN — INSULIN LISPRO 3 UNITS: 100 INJECTION, SOLUTION INTRAVENOUS; SUBCUTANEOUS at 11:29

## 2023-07-31 RX ADMIN — BUDESONIDE 0.5 MG: 0.5 INHALANT RESPIRATORY (INHALATION) at 07:10

## 2023-07-31 RX ADMIN — BUPROPION HYDROCHLORIDE 150 MG: 150 TABLET, FILM COATED, EXTENDED RELEASE ORAL at 08:52

## 2023-07-31 RX ADMIN — ASPIRIN 81 MG CHEWABLE TABLET 81 MG: 81 TABLET CHEWABLE at 08:52

## 2023-07-31 NOTE — RESPIRATORY THERAPY NOTE
Home Oxygen Qualifying Test     Patient name: Yomi Martinez        : 1960   Date of Test:  2023  Diagnosis:    Home Oxygen Test:    **Medicare Guidelines require item(s) 1-5 on all ambulatory patients or 1 and 2 on non-ambulatory patients. 1. Baseline SPO2 on Room Air at rest 91%   a. If <= 88% on Room Air add O2 via NC to obtain SpO2 >=88%. If LPM needed, document LPM N/A needed to reach =>88%    2. SPO2 during exertion on Room Air 97 %  a. During exertion monitor SPO2. If SPO2 increases >=89%, do not add supplemental oxygen    3. SPO2 on Oxygen at Rest N/A% at N/A LPM    4. SPO2 during exertion on Oxygen N/A % at N/A LPM    5. Test performed during exertion activity. []  Supplemental Home Oxygen is indicated. [x]  Client does not qualify for home oxygen. Respiratory Additional Notes- Home O2 not indicated.  Pt SPO2 throughout walk 97%    Siddharth Mcfarland, RT

## 2023-07-31 NOTE — CONSULTS
PULMONOLOGY CONSULT NOTE     Name: Bobby Carson   Age & Sex: 60 y.o. male   MRN: [id number]  Unit/Bed#: [facility] 528-01   Encounter: [id number]        Reason for consultation: hypoxemia    Requesting physician: Albert Acharya MD     Assessment:   1. History of COPD  2. NSTEMI   3. CAD s/p CABG x3  4. Paroxysmal afib  5. DM2  6. HLD    Plan:  • Continue nebulizer treatment  • Encourage incentive spirometry  • Ensure SpO2 >88%  • Pulmonology outpatient follow-up with PFTs       History of Present Illness   HPI:  Bobby Carson is a 60 y.o. male w/ PMH CAD s/p CABG x3 in 2014, paroxysmal afib on eliquis s/p ablation in 2017, HD, DM2, history of COPD but no formal PFTs nor no pulmonologist but uses Breo 1x and albuterol as needed who presented at [facility] with sob and chest pain/tightness. Was transferred to [facility] for cardiac catherization. Was started on nitro drop with relief of chest pain. Underwent cardiac catherization on 7/27 showing diffused disease, no intervention was done. Patient on admission was severely sob and wheezing so was initially started on nebulizer treatments, Solumedrol, and antibiotics for possible COPD exacerbation, but was weaned off of steroids and antibiotics, currently only on nebulizer treatments w/o no nasal canula. Today, patient reports that he is feeling well, with no sob and is able to walk without any issues. No coughing nor significant wheezing. Saturating well w/o nasal canula. Patient uses Breo every day before bed and uses albuterol when needed. No allergies. Patient was a life-long long smoker who smoked about 1 pack per month on-and-off until recently when he quit. No other recreation drug use. Was an  who worked in the [place] in [facility] and was a  during 9/11 and continued to work in the area for years after. Currently has a dog and horses but has had cars and parrots (for 20 years) in the past. Hobbies are golfing and fishing.       Review of systems:  12 point review of systems was completed and was otherwise negative except as listed in HPI. Historical Information   Past Medical History:   Diagnosis Date   • COPD (chronic obstructive pulmonary disease) ([address])    • Diabetes mellitus ([address])    • High cholesterol    • Hypertension      Past Surgical History:   Procedure Laterality Date   • CARDIAC CATHETERIZATION N/A 7/27/2023    Procedure: Cardiac catheterization;  Surgeon: Addie Munoz MD;  Location: [facility]; Service: Cardiology   • CHOLECYSTECTOMY     • SHOULDER SURGERY       No family history on file. Social History    Social History:   Social History     Socioeconomic History   • Marital status: Single     Spouse name: Not on file   • Number of children: Not on file   • Years of education: Not on file   • Highest education level: Not on file   Occupational History   • Not on file   Tobacco Use   • Smoking status: Every Day     Packs/day: 0.25     Types: Cigarettes   • Smokeless tobacco: Never   Vaping Use   • Vaping Use: Never used   Substance and Sexual Activity   • Alcohol use: Never   • Drug use: Never   • Sexual activity: Not on file   Other Topics Concern   • Not on file   Social History Narrative   • Not on file     Social Determinants of Health     Financial Resource Strain: Not on file   Food Insecurity: No Food Insecurity (7/26/2023)    Hunger Vital Sign    • Worried About Running Out of Food in the Last Year: Never true    • Ran Out of Food in the Last Year: Never true   Transportation Needs: No Transportation Needs (7/26/2023)    PRAPARE - Transportation    • Lack of Transportation (Medical): No    • Lack of Transportation (Non-Medical):  No   Physical Activity: Not on file   Stress: Not on file   Social Connections: Not on file   Intimate Partner Violence: Not on file   Housing Stability: Low Risk  (7/26/2023)    Housing Stability Vital Sign    • Unable to Pay for Housing in the Last Year: No    • Number of Places Lived in the Last Year: 1    • Unstable Housing in the Last Year: No       Occupational History:  in [facility],  during 9/11     Meds/Allergies   Current Facility-Administered Medications   Medication Dose Route Frequency   • acetaminophen (TYLENOL) tablet 650 mg  650 mg Oral Q6H PRN   • apixaban (ELIQUIS) tablet 5 mg  5 mg Oral BID   • aspirin chewable tablet 81 mg  81 mg Oral Daily   • atenolol (TENORMIN) tablet 50 mg  50 mg Oral Daily   • atorvastatin (LIPITOR) tablet 40 mg  40 mg Oral Daily With Dinner   • budesonide (PULMICORT) inhalation solution 0.5 mg  0.5 mg Nebulization Q12H   • buPROPion (WELLBUTRIN XL) 24 hr tablet 150 mg  150 mg Oral Daily   • calcium carbonate (TUMS) chewable tablet 500 mg  500 mg Oral TID PRN   • diphenhydrAMINE (BENADRYL) injection 25 mg  25 mg Intravenous Q6H PRN   • formoterol (PERFOROMIST) nebulizer solution 20 mcg  20 mcg Nebulization Q12H   • guaiFENesin (MUCINEX) 12 hr tablet 600 mg  600 mg Oral Q12H [facility]   • insulin glargine (LANTUS) subcutaneous injection 15 Units 0.15 mL  15 Units Subcutaneous HS   • insulin lispro (HumaLOG) 100 units/mL subcutaneous injection 2-12 Units  2-12 Units Subcutaneous TID AC   • insulin lispro (HumaLOG) 100 units/mL subcutaneous injection 3 Units  3 Units Subcutaneous TID With Meals   • ipratropium (ATROVENT) 0.02 % inhalation solution 0.5 mg  0.5 mg Nebulization TID   • isosorbide mononitrate (IMDUR) 24 hr tablet 60 mg  60 mg Oral Daily   • levalbuterol (XOPENEX) inhalation solution 1.25 mg  1.25 mg Nebulization TID   • lidocaine (LIDODERM) 5 % patch 1 patch  1 patch Topical Daily   • nitroglycerin (NITROSTAT) SL tablet 0.4 mg  0.4 mg Sublingual Q5 Min PRN   • ranolazine (RANEXA) 12 hr tablet 500 mg  500 mg Oral BID   • traMADol (ULTRAM) tablet 50 mg  50 mg Oral Q6H PRN     Medications Prior to Admission   Medication   • apixaban (Eliquis) 5 mg   • atenolol (TENORMIN) 50 mg tablet   • buPROPion (ZYBAN) 150 MG 12 hr tablet   • Dapagliflozin-metFORMIN HCl ER (Xigduo XR)  MG TB24   • Fluticasone Furoate-Vilanterol (BREO ELLIPTA IN)   • isosorbide mononitrate (IMDUR) 30 mg 24 hr tablet   • ranolazine (RANEXA) 500 mg 12 hr tablet   • rosuvastatin (CRESTOR) 20 MG tablet   • ALBUTEROL IN     Allergies   Allergen Reactions   • Oxycodone Abdominal Pain     Pt does not respond well to opioids/narcotics   • Penicillins Other (See Comments)     Unknown       Objective    Vitals: Blood pressure 127/72, pulse 66, temperature 97.9 °F (36.6 °C), temperature source Oral, resp. rate 19, height 5' 9" (1.753 m), weight 84.6 kg (186 lb 8.2 oz), SpO2 91 %., , Body mass index is 27.54 kg/m². Intake/Output Summary (Last 24 hours) at 7/31/2023 1517  Last data filed at 7/31/2023 1501  Gross per 24 hour   Intake 120 ml   Output 1700 ml   Net -1580 ml       Physical Exam  Constitutional:       General: He is not in acute distress. Appearance: Normal appearance. He is not ill-appearing. HENT:      Head: Normocephalic and atraumatic. Eyes:      General:         Right eye: No discharge. Left eye: No discharge. Conjunctiva/sclera: Conjunctivae normal.   Cardiovascular:      Rate and Rhythm: Normal rate and regular rhythm. Heart sounds: Normal heart sounds. Pulmonary:      Effort: Pulmonary effort is normal.      Comments: Diffusely decreased breath sounds bilaterally, possible mild wheezing at bases  Abdominal:      General: Abdomen is flat. Bowel sounds are normal. There is no distension. Palpations: Abdomen is soft. Tenderness: There is no abdominal tenderness. Musculoskeletal:      Cervical back: Neck supple. No rigidity. Right lower leg: No edema. Left lower leg: No edema. Skin:     General: Skin is warm and dry. Capillary Refill: Capillary refill takes less than 2 seconds. Neurological:      General: No focal deficit present. Mental Status: He is alert.    Psychiatric:         Mood and Affect: Mood normal.         Behavior: Behavior normal.         Laboratory and Diagnostics  Results from last 7 days   Lab Units 07/30/23  0559 07/29/23  0610 07/27/23  0552 07/26/23  0428 07/25/23  0956   WBC Thousand/uL 14.10* 12.57* 20.28* 15.01* 16.15*   HEMOGLOBIN g/dL 14.4 14.6 14.8 13.9 14.7   HEMATOCRIT % 45.4 47.1 49.5* 46.0 47.8   PLATELETS Thousands/uL 252 271 341 294 293   NEUTROS PCT %  --   --  86* 87* 82*   MONOS PCT %  --   --  6 6 10   EOS PCT %  --   --  0 0 0     Results from last 7 days   Lab Units 07/30/23  0559 07/29/23  0610 07/28/23  1207 07/27/23  0552 07/26/23  0428 07/25/23  0956   SODIUM mmol/L 135 136 135 137 134* 133*   POTASSIUM mmol/L 4.2 4.1 4.1 4.3 4.9 4.3   CHLORIDE mmol/L 99 100 98 101 100 97   CO2 mmol/L 33* 33* 32 33* 29 26   ANION GAP mmol/L 3 3 5 3 5 10   BUN mg/dL 14 13 18 21 23 16   CREATININE mg/dL 0.72 0.73 0.73 0.83 0.77 0.71   CALCIUM mg/dL 8.7 8.6 8.8 9.4 8.6 8.9   GLUCOSE RANDOM mg/dL 194* 227* 243* 230* 194* 128   ALT U/L  --  21  --   --  10 9   AST U/L  --  11  --   --  12* 9*   ALK PHOS U/L  --  71  --   --  64 75   ALBUMIN g/dL  --  2.8*  --   --  3.5 3.9   TOTAL BILIRUBIN mg/dL  --  0.34  --   --  0.29 0.63     Results from last 7 days   Lab Units 07/28/23  1207 07/27/23  0552 07/26/23  1804 07/26/23  0428   MAGNESIUM mg/dL 2.2 2.4 2.1 2.4      Results from last 7 days   Lab Units 07/27/23  0552 07/26/23  2314 07/26/23  1702 07/26/23  1113 07/26/23  0428 07/25/23  0956   INR   --   --   --   --   --  1.07   PTT seconds 61* 39* 45* 34 36 29          Results from last 7 days   Lab Units 07/25/23  1036   LACTIC ACID mmol/L 0.8                 Results from last 7 days   Lab Units 07/25/23  0956   D-DIMER QUANTITATIVE ug/ml FEU 0.63*     Results from last 7 days   Lab Units 07/27/23  0552 07/26/23  0428 07/25/23  1036   PROCALCITONIN ng/ml <0.05 0.06 0.07       ABG:       Micro:  Results from last 7 days   Lab Units 07/26/23  1702 07/26/23  1701 07/25/23  2105 07/25/23  1717 07/25/23  1036   BLOOD CULTURE  No Growth After 4 Days. No Growth After 4 Days. --   --  No Growth After 5 Days. Rothia mucilaginosa*   GRAM STAIN RESULT   --   --   --   --  Gram positive cocci in clusters*   MRSA CULTURE ONLY   --   --  No Methicillin Resistant Staphlyococcus aureus (MRSA) isolated  --   --    LEGIONELLA URINARY ANTIGEN   --   --   --  Negative  --    STREP PNEUMONIAE ANTIGEN, URINE   --   --   --  Negative  --        Imaging and other studies: I have personally reviewed pertinent reports. Pulmonary function testing: none    EKG, Pathology, and Other Studies: I have personally reviewed pertinent reports. Code Status: Level 1 - Full Code    VTE Pharmacologic Prophylaxis: Reason for no pharmacologic prophylaxis on Eliquis  VTE Mechanical Prophylaxis: sequential compression device    Disclaimer: Portions of the record may have been created with voice recognition software. Occasional wrong word or "sound a like" substitutions may have occurred due to the inherent limitations of voice recognition software. Careful consideration should be taken to recognize, using context, where substitutions have occurred.     [address] [facility], [place]  [facility]

## 2023-08-01 ENCOUNTER — TELEPHONE (OUTPATIENT)
Dept: PULMONOLOGY | Facility: CLINIC | Age: 63
End: 2023-08-01

## 2023-08-01 LAB
BACTERIA BLD CULT: NORMAL
BACTERIA BLD CULT: NORMAL

## 2023-08-01 NOTE — ASSESSMENT & PLAN NOTE
· Patient with a history of atrial fibrillation.   Status post ablation in 2019  · Continue with Eliquis and atenolol

## 2023-08-01 NOTE — TELEPHONE ENCOUNTER
LM for patient to give a call back to set up a follow up Appt in [facility] follow up

## 2023-08-01 NOTE — ASSESSMENT & PLAN NOTE
· Patient with known history of coronary artery disease status post CABG. Presented to the hospital with shortness of breath. Started on heparin drip and transferred to [facility] for cardiac catheterization  ·   Status just postcardiac catheterization showing diffuse disease  · Medical management  · Previous history of CABG  · Patient was added to his regimen. Imdur was increased to 60 mg.   Continue with Ranexa and beta-blocker  · Continue with statin  · Outpatient follow-up with cardiology

## 2023-08-01 NOTE — H&P
[address] [place] [address]  H&P  Name: Alcides Daniel 60 y.o. male I MRN: [id number]  [facility] Date of Admission: 7/26/2023   Date of Service: 7/31/2023 I Hospital Day: 5      Assessment/Plan   * NSTEMI (non-ST elevated myocardial infarction) [facility])  Assessment & Plan    · Patient with known history of coronary artery disease status post CABG. Presented to the hospital with shortness of breath. Started on heparin drip and transferred to [address] [place] [address] for cardiac catheterization  ·   Status just postcardiac catheterization showing diffuse disease  · Medical management  · Previous history of CABG  · Patient was added to his regimen. Imdur was increased to 60 mg. Continue with Ranexa and beta-blocker  · Continue with statin  · Outpatient follow-up with cardiology    Hyperlipidemia  Assessment & Plan  · Continue with statin    COPD (chronic obstructive pulmonary disease) ([address])  Assessment & Plan  · No acute exacerbation  Continue with respiratory protocol  Patient with persistent hypoxemia-patient was finally able to be weaned off of the oxygen. Desaturation screen was negative. Need outpatient follow-up with pulmonology for pulmonary function test      Atrial fibrillation [facility])  Assessment & Plan  · Patient with a history of atrial fibrillation. Status post ablation in 2019  · Continue with Eliquis and atenolol    Type 2 diabetes mellitus [facility])  Assessment & Plan  Lab Results   Component Value Date    HGBA1C 8.4 (H) 07/25/2023       Recent Labs     07/30/23  2105 07/31/23  0603 07/31/23  1049 07/31/23  1555   POCGLU 284* 204* 288* 280*       Blood Sugar Average: Last 72 hrs:  (P) 599.5439432185468664   Patient is on oral agents as an outpatient  Encourage diet control. Need outpatient follow-up and will benefit from tighter blood sugar control          .   Medical Problems     Resolved Problems  Date Reviewed: 7/31/2023          Resolved    Hypoxemia 7/31/2023     Resolved by  Radhika Messina MD        Discharging Physician / Practitioner: Radhika Messina MD  PCP: No primary care provider on file. Admission Date:   Admission Orders (From admission, onward)     Ordered        07/26/23 2332  Inpatient Admission  Once                      Discharge Date: 07/31/23    Consultations During Hospital Stay:  · Cardiology    Procedures Performed:   Cardiac catheterization-Mid LM lesion is 100% stenosed. •  Ost RCA lesion is 99% stenosed. •  Mid RCA lesion is 100% stenosed. •  Origin lesion is 100% stenosed. Grafts to LAD and CIRC patent. RCA graft occluded. RCA receives collateral flow. Chronic disease. Continue medical therapy  Significant Findings / Test Results:   ·   CTA chest - no pe   cxr-no acute cardiopulmonary disease    CTA - no pe, no acute pul monary findings   CXR- small lingual opacity may represent pneumonia   '  Echo- normal lv function ef-65%. Grade 2 diastolic dysfunction  RV-normal systolic function, mildly dilated rv    mild aortic stenosis   mild tricuspid regurgitation  Incidental Findings:   ·     Test Results Pending at Discharge (will require follow up):   ·      Outpatient Tests Requested:  ·     Complications:   none    Reason for Admission:  Shortness of breath    Hospital Course:   Pam Whitehead is a 60 y.o. male patient who originally presented to the hospital on 7/26/2023  As a transfer from [facility],Initially presented to [facility] with shortness of breath . 60 y.o. male with a PMH of COPD, CAD s/p CABG + 3x stents, PAF on eliquis s/p ablation, HLD, DM2 who presents with shortness of breath, night sweats, chest tightness, and headache x 2 days. Noted to troponin elevation and started on nitroglycerin drip concerning for anginal equivalent . Transferred to [facility] with concern for NSTEMI and admitted under critical care.    Patient underwent left heart catheterization vein graft to the right is occluded LIMA and vein graft to the OM are patent with small collateral network to the distal right. Increase antianginal medications Imdur was increased to 60 daily continue Ranexa. Symptoms resolved, patient with persistent hypoxemia, acute hypoxic respiratory failure present on admission , pulmonary evaulation appreciated and was able to weaned off of oxygen, desaturation screen negative, patient discharged home is a stable condition      Please see above list of diagnoses and related plan for additional information. Condition at Discharge: good    Discharge Day Visit / Exam:   Subjective:  Patient on room air,  No cp or sob   Vitals: Blood Pressure: 140/72 (07/31/23 1527)  Pulse: 66 (07/31/23 1527)  Temperature: 98.5 °F (36.9 °C) (07/31/23 1527)  Temp Source: Oral (07/31/23 0718)  Respirations: 17 (07/31/23 1527)  Height: 5' 9" (175.3 cm) (07/26/23 2308)  Weight - Scale: 84.6 kg (186 lb 8.2 oz) (07/31/23 0600)  SpO2: 91 % (07/31/23 1527)  Exam:   Physical Exam  HENT:      Head: Normocephalic. Nose: Nose normal.   Eyes:      General: No scleral icterus. Cardiovascular:      Rate and Rhythm: Normal rate and regular rhythm. Pulmonary:      Effort: Pulmonary effort is normal.      Breath sounds: Normal breath sounds. Abdominal:      General: Abdomen is flat. There is no distension. Musculoskeletal:         General: Normal range of motion. Skin:     General: Skin is warm. Neurological:      General: No focal deficit present. Mental Status: He is alert. Discussion with Family: Patient declined call to . Discharge instructions/Information to patient and family:   See after visit summary for information provided to patient and family. Provisions for Follow-Up Care:  See after visit summary for information related to follow-up care and any pertinent home health orders. Disposition:   Home    Planned Readmission:  none     Discharge Statement:  I spent  45  minutes discharging the patient. This time was spent on the day of discharge. I had direct contact with the patient on the day of discharge. Greater than 50% of the total time was spent examining patient, answering all patient questions, arranging and discussing plan of care with patient as well as directly providing post-discharge instructions. Additional time then spent on discharge activities. Discharge Medications:  See after visit summary for reconciled discharge medications provided to patient and/or family.       **Please Note: This note may have been constructed using a voice recognition system**

## 2023-08-01 NOTE — ASSESSMENT & PLAN NOTE
· No acute exacerbation  Continue with respiratory protocol  Patient with persistent hypoxemia-patient was finally able to be weaned off of the oxygen. Desaturation screen was negative.   Need outpatient follow-up with pulmonology for pulmonary function test

## 2023-08-01 NOTE — ASSESSMENT & PLAN NOTE
Lab Results   Component Value Date    HGBA1C 8.4 (H) 07/25/2023       Recent Labs     07/30/23  2105 07/31/23  0603 07/31/23  1049 07/31/23  1555   POCGLU 284* 204* 288* 280*       Blood Sugar Average: Last 72 hrs:  (P) [id number]   Patient is on oral agents as an outpatient  Encourage diet control.   Need outpatient follow-up and will benefit from tighter blood sugar control

## 2023-08-02 NOTE — DISCHARGE SUMMARY
[address] [facility]  Discharge summary  Name: Harvey Cleary y.o. male I MRN: [id number]  Unit/Bed#: [facility]PHP 528-01 I Date of Admission: 7/26/2023   Date of Service: 7/31/2023 I Hospital Day  Assessment/Plan   * NSTEMI (non-ST elevated myocardial infarction) [facility])  Assessment & Plan     • Patient with known history of coronary artery disease status post CABG. Presented to the hospital with shortness of breath. Started on heparin drip and transferred to [facility] for cardiac catheterization  •   Status just postcardiac catheterization showing diffuse disease  • Medical management  • Previous history of CABG  • Patient was added to his regimen. Imdur was increased to 60 mg. Continue with Ranexa and beta-blocker  • Continue with statin  • Outpatient follow-up with cardiology     Hyperlipidemia  Assessment & Plan  • Continue with statin     COPD (chronic obstructive pulmonary disease) ([address])  Assessment & Plan  • No acute exacerbation  Continue with respiratory protocol  Patient with persistent hypoxemia-patient was finally able to be weaned off of the oxygen. Desaturation screen was negative. Need outpatient follow-up with pulmonology for pulmonary function test        Atrial fibrillation [facility])  Assessment & Plan  • Patient with a history of atrial fibrillation. Status post ablation in 2019  • Continue with Eliquis and atenolol     Type 2 diabetes mellitus [facility])  Assessment & Plan        Lab Results   Component Value Date     HGBA1C 8.4 (H) 07/25/2023                Recent Labs     07/30/23  2105 07/31/23  0603 07/31/23  1049 07/31/23  1555   POCGLU 284* 204* 288* 280*         Blood Sugar Average: Last 72 hrs:  (P) [id number]   Patient is on oral agents as an outpatient  Encourage diet control.   Need outpatient follow-up and will benefit from tighter blood sugar control              .      Medical Problems              Resolved Problems  Date Reviewed: 7/31/2023     Resolved     Hypoxemia 7/31/2023       Resolved by  Bob Alfonso MD         Discharging Physician / Practitioner: Bob Alfonso MD  PCP: No primary care provider on file. Admission Date:       Admission Orders (From admission, onward)       Ordered         07/26/23 2332   Inpatient Admission  Once                         Discharge Date: 07/31/23     Consultations During Hospital Stay:  • Cardiology     Procedures Performed:   Cardiac catheterization-Mid LM lesion is 100% stenosed. •  Ost RCA lesion is 99% stenosed. •  Mid RCA lesion is 100% stenosed. •  Origin lesion is 100% stenosed.     Grafts to LAD and CIRC patent.  RCA graft occluded. Stoney Bell receives collateral flow.  Chronic disease.  Continue medical therapy  Significant Findings / Test Results:   •    CTA chest - no pe   cxr-no acute cardiopulmonary disease    CTA - no pe, no acute pul monary findings   CXR- small lingual opacity may represent pneumonia   '  Echo- normal lv function ef-65%. Grade 2 diastolic dysfunction  RV-normal systolic function, mildly dilated rv    mild aortic stenosis   mild tricuspid regurgitation  Incidental Findings:   •       Test Results Pending at Discharge (will require follow up):   •       Outpatient Tests Requested:  •       Complications:   none     Reason for Admission:  Shortness of breath     Hospital Course:   Ursula Menard is a 60 y.o. male patient who originally presented to the hospital on 7/26/2023  As a transfer from [facility],Initially presented to [facility] with shortness of breath . 60 y. o. male with a PMH of COPD, CAD s/p CABG + 3x stents, PAF on eliquis s/p ablation, HLD, DM2 who presents with shortness of breath, night sweats, chest tightness, and headache x 2 days. Noted to troponin elevation and started on nitroglycerin drip concerning for anginal equivalent . Transferred to [facility] with concern for NSTEMI and admitted under critical care.   Patient underwent left heart catheterization vein graft to the right is occluded LIMA and vein graft to the OM are patent with small collateral network to the distal right.  Increase antianginal medications Imdur was increased to 60 daily continue Ranexa. Symptoms resolved, patient with persistent hypoxemia, acute hypoxic respiratory failure present on admission , pulmonary evaulation appreciated and was able to weaned off of oxygen, desaturation screen negative, patient discharged home is a stable condition        Please see above list of diagnoses and related plan for additional information.      Condition at Discharge: good     Discharge Day Visit / Exam:   Subjective:  Patient on room air,  No cp or sob   Vitals: Blood Pressure: 140/72 (07/31/23 1527)  Pulse: 66 (07/31/23 1527)  Temperature: 98.5 °F (36.9 °C) (07/31/23 1527)  Temp Source: Oral (07/31/23 0718)  Respirations: 17 (07/31/23 1527)  Height: 5' 9" (175.3 cm) (07/26/23 2308)  Weight - Scale: 84.6 kg (186 lb 8.2 oz) (07/31/23 0600)  SpO2: 91 % (07/31/23 1527)  Exam:   Physical Exam  HENT:      Head: Normocephalic. Nose: Nose normal.   Eyes:      General: No scleral icterus. Cardiovascular:      Rate and Rhythm: Normal rate and regular rhythm. Pulmonary:      Effort: Pulmonary effort is normal.      Breath sounds: Normal breath sounds. Abdominal:      General: Abdomen is flat. There is no distension. Musculoskeletal:         General: Normal range of motion. Skin:     General: Skin is warm. Neurological:      General: No focal deficit present. Mental Status: He is alert.          Discussion with Family: Patient declined call to .      Discharge instructions/Information to patient and family:   See after visit summary for information provided to patient and family.       Provisions for Follow-Up Care:  See after visit summary for information related to follow-up care and any pertinent home health orders.        Disposition:   Home     Planned Readmission:  none     Discharge Statement:  I spent  45  minutes discharging the patient. This time was spent on the day of discharge. I had direct contact with the patient on the day of discharge. Greater than 50% of the total time was spent examining patient, answering all patient questions, arranging and discussing plan of care with patient as well as directly providing post-discharge instructions.   Additional time then spent on discharge activities.     Discharge Medications:  See after visit summary for reconciled discharge medications provided to patient and/or family.       **Please Note: This note may have been constructed using a voice recognition system**

## 2023-08-09 NOTE — PROGRESS NOTES
Cardiology Follow Up    Bobby Carson  1960  [address] [place] [address] [address] [address]  [phone]    1. Coronary artery disease involving native heart with other form of angina pectoris, unspecified vessel or lesion type ([facility])  ranolazine (RANEXA) 1000 MG SR tablet      2. Mixed hyperlipidemia        3. Paroxysmal atrial fibrillation ([facility])        4. Chronic obstructive pulmonary disease, unspecified COPD type ([address] )        5. Tobacco abuse        6. Type 2 diabetes mellitus with other circulatory complication, without long-term current use of insulin ([facility])            Interval History:   Mr Bobby Carson was admitted to [address] [place] [address] on 7/26 - 7/31/23 with NSTEMI. He was transferred from [facility] with shortness of breath. He presented to the emergency room with a 2-day history of shortness of breath night sweats chest tightness and headaches. Troponins elevated. He was placed on IV nitroglycerin. He was transferred to [address] [place] [address] under critical care. 7/26/23 TTE LVEF 18%, systolic function normal, grade 2 pseudo normal relaxation, mild AS. Trace MR and mild TR.  7/2723 LHC showed mid left main 100% stenosis, ostial RCA 99% stenosis, mid % stenosis, origin lesion 100% stenosis. Graft to LAD and circumflex are patent. RCA graft occluded. RCA with collateral flow. Chronic disease continue medical therapy. Plan medical management increased antianginal medications. Imdur increased to 60 mg daily continue on Ranexa. 7/26/23 TC 91, , HDL 19, LDL 49. Symptoms resolved. He was persistently hypoxemic pulmonary consulted oxygen weaned off and he was discharged home. Mr Bobby Carson presents to our office for a recent hospitalization follow up visit. He is accompanied by his mother. Cyndee Valdivia admits to right sided chest pain with exertion, not occurring on a daily basis.  He is short of breath in the office. According to Hayde Cormier his breathing is stable for him. Oxygen saturation 99% or RA. Medical History   CAD  CABG x 3   Stent  HLD  COPD  Atrial fibrillation hx of ablation, on Eliquis for stroke prevention  DM2 HgbA1C 8.4 on 7/25/23      Patient Active Problem List   Diagnosis   • Acute respiratory failure with hypoxia (HCC)   • SIRS (systemic inflammatory response syndrome) (HCC)   • Type 2 diabetes mellitus (HCC)   • Atrial fibrillation (HCC)   • NSTEMI (non-ST elevated myocardial infarction) (HCC)   • COPD (chronic obstructive pulmonary disease) ([address])   • Hyperlipidemia     Past Medical History:   Diagnosis Date   • COPD (chronic obstructive pulmonary disease) (HCC)    • Diabetes mellitus (HCC)    • High cholesterol    • Hypertension      Social History     Socioeconomic History   • Marital status: Single     Spouse name: Not on file   • Number of children: Not on file   • Years of education: Not on file   • Highest education level: Not on file   Occupational History   • Not on file   Tobacco Use   • Smoking status: Every Day     Packs/day: 0.25     Types: Cigarettes   • Smokeless tobacco: Never   Vaping Use   • Vaping Use: Never used   Substance and Sexual Activity   • Alcohol use: Never   • Drug use: Never   • Sexual activity: Not on file   Other Topics Concern   • Not on file   Social History Narrative   • Not on file     Social Determinants of Health     Financial Resource Strain: Not on file   Food Insecurity: No Food Insecurity (7/26/2023)    Hunger Vital Sign    • Worried About Running Out of Food in the Last Year: Never true    • Ran Out of Food in the Last Year: Never true   Transportation Needs: No Transportation Needs (7/26/2023)    PRAPARE - Transportation    • Lack of Transportation (Medical): No    • Lack of Transportation (Non-Medical):  No   Physical Activity: Not on file   Stress: Not on file   Social Connections: Not on file   Intimate Partner Violence: Not on file   Housing Stability: Low Risk  (7/26/2023)    Housing Stability Vital Sign    • Unable to Pay for Housing in the Last Year: No    • Number of Places Lived in the Last Year: 1    • Unstable Housing in the Last Year: No      No family history on file. Past Surgical History:   Procedure Laterality Date   • CARDIAC CATHETERIZATION N/A 7/27/2023    Procedure: Cardiac catheterization;  Surgeon: Verena Mc MD;  Location: [facility];   Service: Cardiology   • CHOLECYSTECTOMY     • SHOULDER SURGERY         Current Outpatient Medications:   •  albuterol (Proventil HFA) 90 mcg/act inhaler, Inhale 2 puffs every 4 (four) hours as needed for wheezing, Disp: 6.7 g, Rfl: 0  •  apixaban (Eliquis) 5 mg, Take 5 mg by mouth 2 (two) times a day, Disp: , Rfl:   •  aspirin 81 mg chewable tablet, Chew 1 tablet (81 mg total) daily Do not start before August 1, 2023., Disp: , Rfl: 0  •  atenolol (TENORMIN) 50 mg tablet, Take 50 mg by mouth daily, Disp: , Rfl:   •  buPROPion (ZYBAN) 150 MG 12 hr tablet, Take 150 mg by mouth in the morning, Disp: , Rfl:   •  Dapagliflozin-metFORMIN HCl ER (Xigduo XR)  MG TB24, Take by mouth, Disp: , Rfl:   •  Fluticasone Furoate-Vilanterol (BREO ELLIPTA IN), Inhale, Disp: , Rfl:   •  isosorbide mononitrate (IMDUR) 30 mg 24 hr tablet, Take 2 tablets (60 mg total) by mouth daily, Disp: 30 tablet, Rfl: 0  •  nitroglycerin (NITROSTAT) 0.4 mg SL tablet, Place 1 tablet (0.4 mg total) under the tongue every 5 (five) minutes as needed for chest pain, Disp: 30 tablet, Rfl: 0  •  ranolazine (RANEXA) 500 mg 12 hr tablet, Take 500 mg by mouth 2 (two) times a day, Disp: , Rfl:   •  rosuvastatin (CRESTOR) 20 MG tablet, Take 20 mg by mouth daily, Disp: , Rfl:   Allergies   Allergen Reactions   • Oxycodone Abdominal Pain     Pt does not respond well to opioids/narcotics   • Penicillins Other (See Comments)     Unknown       Labs:  Admission on 07/26/2023, Discharged on 07/31/2023   Component Date Value   • PTT 07/26/2023 39 (H)    • Sodium 07/27/2023 137    • Potassium 07/27/2023 4.3    • Chloride 07/27/2023 101    • CO2 07/27/2023 33 (H)    • ANION GAP 07/27/2023 3    • BUN 07/27/2023 21    • Creatinine 07/27/2023 0.83    • Glucose 07/27/2023 230 (H)    • Calcium 07/27/2023 9.4    • eGFR 07/27/2023 93    • WBC 07/27/2023 20.28 (H)    • RBC 07/27/2023 6.18 (H)    • Hemoglobin 07/27/2023 14.8    • Hematocrit 07/27/2023 49.5 (H)    • MCV 07/27/2023 80 (L)    • MCH 07/27/2023 23.9 (L)    • MCHC 07/27/2023 29.9 (L)    • RDW 07/27/2023 17.9 (H)    • MPV 07/27/2023 9.2    • Platelets 06/67/6330 341    • nRBC 07/27/2023 0    • Neutrophils Relative 07/27/2023 86 (H)    • Immat GRANS % 07/27/2023 2    • Lymphocytes Relative 07/27/2023 6 (L)    • Monocytes Relative 07/27/2023 6    • Eosinophils Relative 07/27/2023 0    • Basophils Relative 07/27/2023 0    • Neutrophils Absolute 07/27/2023 17.44 (H)    • Immature Grans Absolute 07/27/2023 0.36 (H)    • Lymphocytes Absolute 07/27/2023 1.25    • Monocytes Absolute 07/27/2023 1.14    • Eosinophils Absolute 07/27/2023 0.00    • Basophils Absolute 07/27/2023 0.09    • Magnesium 07/27/2023 2.4    • Procalcitonin 07/27/2023 <0.05    • PTT 07/27/2023 61 (H)    • POC Glucose 07/27/2023 246 (H)    • Ventricular Rate 07/27/2023 58    • Atrial Rate 07/27/2023 58    • MO Interval 07/27/2023 144    • QRSD Interval 07/27/2023 100    • QT Interval 07/27/2023 462    • QTC Interval 07/27/2023 453    • P Axis 07/27/2023 72    • QRS Axis 07/27/2023 53    • T Wave Axis 07/27/2023 81    • POC Glucose 07/27/2023 284 (H)    • POC Glucose 07/27/2023 294 (H)    • POC Glucose 07/28/2023 226 (H)    • Magnesium 07/28/2023 2.2    • Sodium 07/28/2023 135    • Potassium 07/28/2023 4.1    • Chloride 07/28/2023 98    • CO2 07/28/2023 32    • ANION GAP 07/28/2023 5    • BUN 07/28/2023 18    • Creatinine 07/28/2023 0.73    • Glucose 07/28/2023 243 (H)    • Calcium 07/28/2023 8.8    • eGFR 07/28/2023 98 • POC Glucose 07/28/2023 276 (H)    • BNP 07/28/2023 205 (H)    • POC Glucose 07/28/2023 196 (H)    • POC Glucose 07/28/2023 227 (H)    • WBC 07/29/2023 12.57 (H)    • RBC 07/29/2023 5.89 (H)    • Hemoglobin 07/29/2023 14.6    • Hematocrit 07/29/2023 47.1    • MCV 07/29/2023 80 (L)    • MCH 07/29/2023 24.8 (L)    • MCHC 07/29/2023 31.0 (L)    • RDW 07/29/2023 17.2 (H)    • Platelets 42/13/9564 271    • MPV 07/29/2023 9.4    • Sodium 07/29/2023 136    • Potassium 07/29/2023 4.1    • Chloride 07/29/2023 100    • CO2 07/29/2023 33 (H)    • ANION GAP 07/29/2023 3    • BUN 07/29/2023 13    • Creatinine 07/29/2023 0.73    • Glucose 07/29/2023 227 (H)    • Calcium 07/29/2023 8.6    • Corrected Calcium 07/29/2023 9.6    • AST 07/29/2023 11    • ALT 07/29/2023 21    • Alkaline Phosphatase 07/29/2023 71    • Total Protein 07/29/2023 6.3 (L)    • Albumin 07/29/2023 2.8 (L)    • Total Bilirubin 07/29/2023 0.34    • eGFR 07/29/2023 98    • POC Glucose 07/29/2023 212 (H)    • POC Glucose 07/29/2023 283 (H)    • POC Glucose 07/29/2023 152 (H)    • POC Glucose 07/29/2023 268 (H)    • WBC 07/30/2023 14.10 (H)    • RBC 07/30/2023 5.80 (H)    • Hemoglobin 07/30/2023 14.4    • Hematocrit 07/30/2023 45.4    • MCV 07/30/2023 78 (L)    • MCH 07/30/2023 24.8 (L)    • MCHC 07/30/2023 31.7    • RDW 07/30/2023 17.1 (H)    • Platelets 33/17/3186 252    • MPV 07/30/2023 9.1    • Sodium 07/30/2023 135    • Potassium 07/30/2023 4.2    • Chloride 07/30/2023 99    • CO2 07/30/2023 33 (H)    • ANION GAP 07/30/2023 3    • BUN 07/30/2023 14    • Creatinine 07/30/2023 0.72    • Glucose 07/30/2023 194 (H)    • Calcium 07/30/2023 8.7    • eGFR 07/30/2023 99    • POC Glucose 07/30/2023 199 (H)    • POC Glucose 07/30/2023 311 (H)    • POC Glucose 07/30/2023 171 (H)    • POC Glucose 07/30/2023 284 (H)    • POC Glucose 07/31/2023 204 (H)    • POC Glucose 07/31/2023 288 (H)    • POC Glucose 07/31/2023 280 (H)    No results displayed because visit has over 200 results. Imaging: CTA chest pe study    Result Date: 7/29/2023  Narrative: CTA - CHEST WITH IV CONTRAST - PULMONARY ANGIOGRAM INDICATION:   hypoxemia. COMPARISON: 7/25/2023 TECHNIQUE: CTA examination of the chest was performed using angiographic technique according to a protocol specifically tailored to evaluate for pulmonary embolism. Multiplanar 2D reformatted images were created from the source data. In addition, coronal 3D MIP postprocessing was performed on the acquisition scanner. Radiation dose length product (DLP) for this visit:  606.12 mGy-cm . This examination, like all CT scans performed in the [facility], was performed utilizing techniques to minimize radiation dose exposure, including the use of iterative  reconstruction and automated exposure control. IV Contrast:  85 mL of iohexol (OMNIPAQUE) FINDINGS: PULMONARY ARTERIAL TREE:  No pulmonary embolus is seen. LUNGS:  Lungs are clear. There is no tracheal or endobronchial lesion. PLEURA:  Unremarkable. HEART/GREAT VESSELS: Patient is status post coronary artery bypass grafting. No thoracic aortic aneurysm. MEDIASTINUM AND ARIADNE:  Unremarkable. CHEST WALL AND LOWER NECK:   Unremarkable. VISUALIZED STRUCTURES IN THE UPPER ABDOMEN:  Unremarkable. OSSEOUS STRUCTURES:  No acute fracture or destructive osseous lesion. Impression: No evidence of pulmonary embolus. Workstation performed: [id number]     XR chest portable    Result Date: 7/28/2023  Narrative: CHEST INDICATION:   Hypoxia. COMPARISON: Chest radiograph 7/25/2023. EXAM PERFORMED/VIEWS:  XR CHEST PORTABLE FINDINGS: Median sternotomy wires are intact. Cardiomediastinal silhouette appears unremarkable. Mild lingular atelectasis. No focal consolidation. No pneumothorax or pleural effusion. Osseous structures appear within normal limits for patient age. Impression: No acute cardiopulmonary disease.  Workstation performed: [id number]     Cardiac catheterization    Result Date: 7/27/2023  Narrative: •  Mid LM lesion is 100% stenosed. •  Ost RCA lesion is 99% stenosed. •  Mid RCA lesion is 100% stenosed. •  Origin lesion is 100% stenosed. Grafts to LAD and CIRC patent. RCA graft occluded. RCA receives collateral flow. Chronic disease. Continue medical therapy     Echo complete w/ contrast if indicated    Result Date: 7/26/2023  Narrative: •  Left Ventricle: Left ventricular cavity size is normal. Wall thickness is mildly increased. The left ventricular ejection fraction is 65% by visual estimation. . Systolic function is normal. Although no diagnostic regional wall motion abnormality was identified, this possibility cannot be completely excluded on the basis of this study. Diastolic function is moderately abnormal, consistent with grade II (pseudonormal) relaxation. •  Right Ventricle: Right ventricular cavity size is mildly dilated. Systolic function is normal. •  Aortic Valve: The aortic valve cannot be ruled out as bicuspid. There is mild stenosis. •  Tricuspid Valve: There is mild regurgitation. The right ventricular systolic pressure is mildly elevated. The estimated right ventricular systolic pressure is 92.94 mmHg. XR chest 1 view portable    Result Date: 7/25/2023  Narrative: CHEST INDICATION:   SOB. COMPARISON: 7/30/2022 EXAM PERFORMED/VIEWS:  XR CHEST PORTABLE Images: 2 FINDINGS: There are median sternotomy wires indicating prior cardiac surgery. Cardiomediastinal silhouette appears unremarkable. There is lingular opacity. No pleural effusion or pneumothorax. COPD. Osseous structures appear within normal limits for patient age. Impression: Small lingular opacity may represent pneumonia. The study was marked in [facility] for immediate notification. Workstation performed: [id number]     CTA ED chest PE study    Result Date: 7/25/2023  Narrative: CTA - CHEST WITH IV CONTRAST - PULMONARY ANGIOGRAM INDICATION:   SOB, elevated ddimer.  COMPARISON: None. TECHNIQUE: CTA examination of the chest was performed using angiographic technique according to a protocol specifically tailored to evaluate for pulmonary embolism. Multiplanar 2D reformatted images were created from the source data. In addition, coronal 3D MIP postprocessing was performed on the acquisition scanner. Radiation dose length product (DLP) for this visit:  466.68 mGy-cm . This examination, like all CT scans performed in the [place], was performed utilizing techniques to minimize radiation dose exposure, including the use of iterative  reconstruction and automated exposure control. IV Contrast:  100 mL of iohexol (OMNIPAQUE) FINDINGS: PULMONARY ARTERIAL TREE:  No pulmonary embolus is seen. LUNGS: Pulmonary scarring noted left upper lobe especially within the lingular segment. Mild dependent changes noted at the lung bases. PLEURA:  Unremarkable. HEART/GREAT VESSELS:  Unremarkable for patient's age. No thoracic aortic aneurysm. MEDIASTINUM AND ARIADNE:  Unremarkable. CHEST WALL AND LOWER NECK:   Unremarkable. VISUALIZED STRUCTURES IN THE UPPER ABDOMEN: Enlarged fatty liver noted. Status post cholecystectomy. OSSEOUS STRUCTURES:  No acute fracture or destructive osseous lesion. Impression: No PE. No acute pulmonary findings. Workstation performed: [id number]       Review of Systems:  Review of Systems   Respiratory: Positive for shortness of breath. Cardiovascular: Positive for chest pain. All other systems reviewed and are negative. Physical Exam:  Physical Exam  Vitals reviewed. Constitutional:       Appearance: Normal appearance. Cardiovascular:      Rate and Rhythm: Normal rate and regular rhythm. Pulses: Normal pulses. Heart sounds: Normal heart sounds. Pulmonary:      Effort: Pulmonary effort is normal.      Comments: Diminished breath sounds throughout   Musculoskeletal:         General: Normal range of motion.       Cervical back: Normal range of motion and neck supple. Right lower leg: No edema. Left lower leg: No edema. Skin:     General: Skin is warm and dry. Capillary Refill: Capillary refill takes less than 2 seconds. Neurological:      General: No focal deficit present. Mental Status: He is alert and oriented to person, place, and time. Psychiatric:         Mood and Affect: Mood normal.         Behavior: Behavior normal.         Discussion/Summary:  # CAD, 7/27/23 LHC showed  Graft to LAD and circumflex are patent. RCA graft occluded. RCA with collateral flow. Chronic disease, continue on medical therapy, Imdur 60mg daily, increase Ranexa from 500mg BID to 1000mg BID, continue on ASA 81mg daily, Eliquis 5mg BID, Atenolol 50mg BID,  Crestor 20mg daily, I have encouraged cardiac rehabilitation. Heart healthy diet  # Hyperlipidemia 7/26/23 TC 91, , HDL 19, LDL 49.   continue on Crestor 40mg daily heart healthy diet   # Paroxysmal atrial fibrillation hx of ablation continue on Eliquis 5mg BID and Atenolol 50mg BID   # COPD follow up with Pulmonary 8/16/23  # Tobacco abuse one month ago stopped smoking, continues with smoking cessation.    # DM2 HgbA1C 8.4 on 7/25/23 not controlled, follow up with PCP

## 2023-08-10 ENCOUNTER — OFFICE VISIT (OUTPATIENT)
Dept: CARDIOLOGY CLINIC | Facility: CLINIC | Age: 63
End: 2023-08-10

## 2023-08-10 VITALS
BODY MASS INDEX: 28.2 KG/M2 | DIASTOLIC BLOOD PRESSURE: 68 MMHG | HEART RATE: 62 BPM | WEIGHT: 190.4 LBS | SYSTOLIC BLOOD PRESSURE: 146 MMHG | OXYGEN SATURATION: 99 % | HEIGHT: 69 IN

## 2023-08-10 DIAGNOSIS — I25.118 CORONARY ARTERY DISEASE INVOLVING NATIVE HEART WITH OTHER FORM OF ANGINA PECTORIS, UNSPECIFIED VESSEL OR LESION TYPE (HCC): Primary | ICD-10-CM

## 2023-08-10 DIAGNOSIS — E78.2 MIXED HYPERLIPIDEMIA: ICD-10-CM

## 2023-08-10 DIAGNOSIS — I48.0 PAROXYSMAL ATRIAL FIBRILLATION (HCC): ICD-10-CM

## 2023-08-10 DIAGNOSIS — J44.9 CHRONIC OBSTRUCTIVE PULMONARY DISEASE, UNSPECIFIED COPD TYPE (HCC): ICD-10-CM

## 2023-08-10 DIAGNOSIS — Z72.0 TOBACCO ABUSE: ICD-10-CM

## 2023-08-10 DIAGNOSIS — E11.59 TYPE 2 DIABETES MELLITUS WITH OTHER CIRCULATORY COMPLICATION, WITHOUT LONG-TERM CURRENT USE OF INSULIN (HCC): ICD-10-CM

## 2023-08-10 RX ORDER — RANOLAZINE 1000 MG/1
1000 TABLET, EXTENDED RELEASE ORAL 2 TIMES DAILY
Qty: 90 TABLET | Refills: 3 | Status: SHIPPED | OUTPATIENT
Start: 2023-08-10

## 2023-08-16 ENCOUNTER — OFFICE VISIT (OUTPATIENT)
Age: 63
End: 2023-08-16
Payer: MEDICARE

## 2023-08-16 VITALS
BODY MASS INDEX: 28.14 KG/M2 | HEIGHT: 69 IN | SYSTOLIC BLOOD PRESSURE: 126 MMHG | WEIGHT: 190 LBS | OXYGEN SATURATION: 91 % | TEMPERATURE: 96.9 F | DIASTOLIC BLOOD PRESSURE: 70 MMHG | HEART RATE: 68 BPM

## 2023-08-16 DIAGNOSIS — I21.4 NSTEMI (NON-ST ELEVATED MYOCARDIAL INFARCTION) (HCC): ICD-10-CM

## 2023-08-16 DIAGNOSIS — J44.9 COPD, SEVERITY TO BE DETERMINED (HCC): ICD-10-CM

## 2023-08-16 DIAGNOSIS — R06.02 SHORTNESS OF BREATH: Primary | ICD-10-CM

## 2023-08-16 DIAGNOSIS — F17.211 NICOTINE DEPENDENCE, CIGARETTES, IN REMISSION: ICD-10-CM

## 2023-08-16 PROCEDURE — 99214 OFFICE O/P EST MOD 30 MIN: CPT | Performed by: PHYSICIAN ASSISTANT

## 2023-08-16 RX ORDER — LEVALBUTEROL TARTRATE 45 UG/1
1-2 AEROSOL, METERED ORAL EVERY 4 HOURS PRN
Qty: 15 G | Refills: 3 | Status: SHIPPED | OUTPATIENT
Start: 2023-08-16

## 2023-08-16 NOTE — PROGRESS NOTES
Assessment:    1. Shortness of breath  Complete PFT with post bronchodilator    levalbuterol (Xopenex HFA) 45 mcg/act inhaler      2. COPD, severity to be determined ([address])  levalbuterol (Xopenex HFA) 45 mcg/act inhaler      3. Nicotine dependence, cigarettes, in remission        4. NSTEMI (non-ST elevated myocardial infarction) [facility])              Plan:   · Patient presenting for hospital follow-up, doing well from respiratory standpoint  · Discussed with him he will need complete PFTs to officially diagnose his COPD and determine severity  · Reviewed chest CT which was negative for any suspicious lung nodules however would be appropriate for annual screening given his significant smoking history  · Congratulated him on recently quitting and encouraged him to remain tobacco free  · For now, continue Breo 1 puff daily. Likely need to adjust to maintenance inhaler based on PFT results. · Patient does not tolerate albuterol due to tachycardia. Trial Xopenex inhaler. · Continue follow-up with cardiology    Subjective:     Patient ID: Margart Ormond is a 60 y.o. male. Chief Complaint:    Naomy Marroquin is a 60-year-old presenting for hospital follow-up. Recently admitted for NSTEMI. Also was treated for COPD exacerbation. Discharged without the need for supplemental oxygen. Patient is doing much better feeling back to his baseline health. Reports chronic shortness of breath. He feels that this is ever since he was at [facility] during 911. Triggers include humidity. Patient lives in [place] most of the year. He has quit smoking. He is now retired.       The following portions of the patient's history were reviewed in this encounter and updated as appropriate:   Review of Systems   Constitutional: Negative for chills and fever. Respiratory: Positive for cough and shortness of breath. All other systems reviewed and are negative. Objective:    Physical Exam  Vitals reviewed.    Constitutional: General: He is not in acute distress. Appearance: He is not toxic-appearing. HENT:      Head: Normocephalic and atraumatic. Eyes:      General: No scleral icterus. Cardiovascular:      Rate and Rhythm: Normal rate and regular rhythm. Pulmonary:      Effort: Pulmonary effort is normal.      Comments: Decreased breath sounds  Musculoskeletal:         General: No signs of injury. Skin:     General: Skin is warm and dry. Neurological:      General: No focal deficit present. Mental Status: He is alert. Mental status is at baseline.    Psychiatric:         Mood and Affect: Mood normal.         Behavior: Behavior normal.         Lab Review:   not applicable

## 2023-08-23 ENCOUNTER — HOSPITAL ENCOUNTER (OUTPATIENT)
Dept: PULMONOLOGY | Facility: HOSPITAL | Age: 63
Discharge: HOME | End: 2023-08-23
Payer: MEDICARE

## 2023-08-23 DIAGNOSIS — R06.02 SHORTNESS OF BREATH: ICD-10-CM

## 2023-08-23 PROCEDURE — 94060 EVALUATION OF WHEEZING: CPT

## 2023-08-23 PROCEDURE — 94760 N-INVAS EAR/PLS OXIMETRY 1: CPT

## 2023-08-23 PROCEDURE — 94726 PLETHYSMOGRAPHY LUNG VOLUMES: CPT | Performed by: INTERNAL MEDICINE

## 2023-08-23 PROCEDURE — 94729 DIFFUSING CAPACITY: CPT | Performed by: INTERNAL MEDICINE

## 2023-08-23 PROCEDURE — 94726 PLETHYSMOGRAPHY LUNG VOLUMES: CPT

## 2023-08-23 PROCEDURE — 94060 EVALUATION OF WHEEZING: CPT | Performed by: INTERNAL MEDICINE

## 2023-08-23 PROCEDURE — 94729 DIFFUSING CAPACITY: CPT

## 2023-08-23 RX ORDER — LEVALBUTEROL INHALATION SOLUTION 1.25 MG/3ML
1.25 SOLUTION RESPIRATORY (INHALATION) EVERY 8 HOURS PRN
Status: DISCONTINUED | OUTPATIENT
Start: 2023-08-23 | End: 2023-08-27 | Stop reason: HOSPADM

## 2023-08-23 RX ORDER — SODIUM CHLORIDE FOR INHALATION 0.9 %
3 VIAL, NEBULIZER (ML) INHALATION
Status: DISCONTINUED | OUTPATIENT
Start: 2023-08-23 | End: 2023-08-27 | Stop reason: HOSPADM

## 2023-08-23 RX ADMIN — LEVALBUTEROL HYDROCHLORIDE 1.25 MG: 1.25 SOLUTION RESPIRATORY (INHALATION) at 11:11

## 2023-08-23 RX ADMIN — Medication 3 ML: at 11:11

## 2023-08-24 DIAGNOSIS — R06.02 SHORTNESS OF BREATH: ICD-10-CM

## 2023-08-24 DIAGNOSIS — J44.9 COPD, SEVERITY TO BE DETERMINED (HCC): ICD-10-CM

## 2023-08-30 ENCOUNTER — OFFICE VISIT (OUTPATIENT)
Age: 63
End: 2023-08-30
Payer: MEDICARE

## 2023-08-30 VITALS
DIASTOLIC BLOOD PRESSURE: 68 MMHG | WEIGHT: 189.2 LBS | OXYGEN SATURATION: 93 % | SYSTOLIC BLOOD PRESSURE: 128 MMHG | HEART RATE: 80 BPM | BODY MASS INDEX: 28.02 KG/M2 | HEIGHT: 69 IN | RESPIRATION RATE: 18 BRPM

## 2023-08-30 DIAGNOSIS — I21.4 NSTEMI (NON-ST ELEVATED MYOCARDIAL INFARCTION) (HCC): ICD-10-CM

## 2023-08-30 DIAGNOSIS — F17.211 NICOTINE DEPENDENCE, CIGARETTES, IN REMISSION: ICD-10-CM

## 2023-08-30 DIAGNOSIS — R06.02 SHORTNESS OF BREATH: Primary | ICD-10-CM

## 2023-08-30 DIAGNOSIS — J44.9 COPD, SEVERE (HCC): ICD-10-CM

## 2023-08-30 PROCEDURE — 99214 OFFICE O/P EST MOD 30 MIN: CPT | Performed by: PHYSICIAN ASSISTANT

## 2023-08-30 RX ORDER — ALBUTEROL SULFATE 90 UG/1
AEROSOL, METERED RESPIRATORY (INHALATION)
Refills: 0 | OUTPATIENT
Start: 2023-08-30

## 2023-08-30 RX ORDER — FLUTICASONE FUROATE, UMECLIDINIUM BROMIDE AND VILANTEROL TRIFENATATE 100; 62.5; 25 UG/1; UG/1; UG/1
1 POWDER RESPIRATORY (INHALATION) DAILY
Qty: 60 BLISTER | Refills: 11 | Status: SHIPPED | OUTPATIENT
Start: 2023-08-30 | End: 2023-09-29

## 2023-08-30 NOTE — PROGRESS NOTES
Assessment:    1. Shortness of breath  fluticasone-umeclidinium-vilanterol (Trelegy Ellipta) 100-62.5-25 mcg/actuation inhaler      2. COPD, severe ([address])  fluticasone-umeclidinium-vilanterol (Trelegy Ellipta) 100-62.5-25 mcg/actuation inhaler      3. Nicotine dependence, cigarettes, in remission        4. NSTEMI (non-ST elevated myocardial infarction) [facility])            Plan:   • Patient presenting for follow-up to review complete PFTs  • We discussed that his PFTs demonstrate severe obstruction with bronchodilator responsiveness consistent with severe COPD and asthma overlap  • We will transition him from Breo to Trelegy Ellipta 1 puff daily  • He may use Xopenex as needed. Does not tolerate albuterol due to tachycardia. Advised him that Xopenex can still have effect on heart rate and to monitor  • Reviewed chest CT which was negative for any suspicious lung nodules however would be appropriate for annual screening given his significant smoking history  • Congratulated him on recently quitting and encouraged him to remain tobacco free  • Patient will be living in [place] until next summer so we will hold off on any referral for pulmonary rehab  • Continue follow-up with cardiology    Subjective:     Patient ID: Hamzah Torres is a 60 y.o. male. Chief Complaint:  Sandra Bazzi is a 41-year-old presenting for follow-up. Recently admitted for NSTEMI. Also was treated for COPD exacerbation. Discharged without the need for supplemental oxygen. Patient is doing much better feeling back to his baseline health. Reports chronic shortness of breath. He feels that this is ever since he was at [facility] during 911. Triggers include humidity. Patient lives in [place] most of the year. He has quit smoking. He is now retired. Recently started taking super beets and feels this has been helping all aspects of his health.     The following portions of the patient's history were reviewed in this encounter and updated as appropriate:   Review of Systems   Respiratory: Positive for shortness of breath. All other systems reviewed and are negative. Objective:    Physical Exam  Vitals reviewed. Constitutional:       General: He is not in acute distress. Appearance: He is not toxic-appearing. HENT:      Head: Normocephalic and atraumatic. Eyes:      General: No scleral icterus. Cardiovascular:      Rate and Rhythm: Normal rate and regular rhythm. Pulmonary:      Effort: Pulmonary effort is normal.      Comments: Decreased breath sounds  Musculoskeletal:         General: No signs of injury. Skin:     General: Skin is warm and dry. Neurological:      General: No focal deficit present. Mental Status: He is alert. Mental status is at baseline.    Psychiatric:         Mood and Affect: Mood normal.         Behavior: Behavior normal.         Lab Review:   not applicable

## 2023-08-31 NOTE — TELEPHONE ENCOUNTER
Received an approval for Mr. Cally Cobb  Levalbuterol Tartrate 45MCG/ACT aerosol     [id number]; Review Type:Prior Auth; Coverage Start Date:07/31/2023; Coverage End Date:08/29/2024     Left patient a message letting him know his Levalbuterol inhaler has been approved.

## 2023-08-31 NOTE — TELEPHONE ENCOUNTER
Levalbuterol tartrate 45 mcg has been approved. Left patient a message letting him know and I have also notified the pharmacy.

## 2023-09-08 DIAGNOSIS — I25.118 CORONARY ARTERY DISEASE INVOLVING NATIVE HEART WITH OTHER FORM OF ANGINA PECTORIS, UNSPECIFIED VESSEL OR LESION TYPE (HCC): ICD-10-CM

## 2023-09-11 RX ORDER — RANOLAZINE 1000 MG/1
1 TABLET, EXTENDED RELEASE ORAL 2 TIMES DAILY
Qty: 180 TABLET | Refills: 2 | Status: SHIPPED | OUTPATIENT
Start: 2023-09-11

## 2023-09-13 ENCOUNTER — TELEPHONE (OUTPATIENT)
Dept: CARDIOLOGY CLINIC | Facility: CLINIC | Age: 63
End: 2023-09-13

## 2023-09-13 DIAGNOSIS — I21.4 NSTEMI (NON-ST ELEVATED MYOCARDIAL INFARCTION) (HCC): ICD-10-CM

## 2023-09-13 RX ORDER — NITROGLYCERIN 0.4 MG/1
0.4 TABLET SUBLINGUAL
Qty: 25 TABLET | Refills: 2 | Status: SHIPPED | OUTPATIENT
Start: 2023-09-13

## 2023-09-13 RX ORDER — ISOSORBIDE MONONITRATE 30 MG/1
60 TABLET, EXTENDED RELEASE ORAL DAILY
Qty: 60 TABLET | Refills: 5 | Status: CANCELLED | OUTPATIENT
Start: 2023-09-13

## 2023-09-13 NOTE — TELEPHONE ENCOUNTER
Last visit was 8/10-pt called for refill on Nitro  . Patient claims he is having chest pain a couple time a week should he be worried is the  pt question & concerned

## 2023-09-13 NOTE — TELEPHONE ENCOUNTER
Patient called in asking if [facility] can refill his IMDUR prescription that he was given when in the hospital. He is asking for 60 tablet to the [facility] on file. Please advise.

## 2023-09-14 NOTE — TELEPHONE ENCOUNTER
Tried to call pt again this morning left a message on to call office with update on having chest pains and the request for the nitro script.

## (undated) DEVICE — MICROPUNCTURE INTRODUCER SET SILHOUETTE TRANSITIONLESS PUSH-PLUS DESIGN WITH NITINOL WIRE GUIDE: Brand: MICROPUNCTURE

## (undated) DEVICE — CATH DIAG 6FR IMPULSE 100CM FR4

## (undated) DEVICE — DGW .035 FC J3MM 150CM TEF: Brand: EMERALD

## (undated) DEVICE — CATH DIAG 6FR IMPULSE 100CM FL4